# Patient Record
Sex: MALE | Race: WHITE | Employment: FULL TIME | URBAN - METROPOLITAN AREA
[De-identification: names, ages, dates, MRNs, and addresses within clinical notes are randomized per-mention and may not be internally consistent; named-entity substitution may affect disease eponyms.]

---

## 2018-07-20 ENCOUNTER — APPOINTMENT (OUTPATIENT)
Dept: GENERAL RADIOLOGY | Facility: CLINIC | Age: 23
End: 2018-07-20
Attending: EMERGENCY MEDICINE
Payer: COMMERCIAL

## 2018-07-20 ENCOUNTER — HOSPITAL ENCOUNTER (INPATIENT)
Facility: CLINIC | Age: 23
LOS: 2 days | Discharge: HOME | End: 2018-07-22
Attending: EMERGENCY MEDICINE | Admitting: INTERNAL MEDICINE
Payer: COMMERCIAL

## 2018-07-20 DIAGNOSIS — E10.10 DIABETIC KETOACIDOSIS WITHOUT COMA ASSOCIATED WITH TYPE 1 DIABETES MELLITUS (H): ICD-10-CM

## 2018-07-20 LAB
ALBUMIN SERPL-MCNC: 3.4 G/DL (ref 3.4–5)
ALBUMIN UR-MCNC: 30 MG/DL
ALP SERPL-CCNC: 151 U/L (ref 40–150)
ALT SERPL W P-5'-P-CCNC: 59 U/L (ref 0–70)
ANION GAP SERPL CALCULATED.3IONS-SCNC: 12 MMOL/L (ref 3–14)
ANION GAP SERPL CALCULATED.3IONS-SCNC: 15 MMOL/L (ref 3–14)
ANION GAP SERPL CALCULATED.3IONS-SCNC: 15 MMOL/L (ref 3–14)
ANION GAP SERPL CALCULATED.3IONS-SCNC: 17 MMOL/L (ref 3–14)
ANION GAP SERPL CALCULATED.3IONS-SCNC: 19 MMOL/L (ref 3–14)
ANION GAP SERPL CALCULATED.3IONS-SCNC: 19 MMOL/L (ref 3–14)
ANION GAP SERPL CALCULATED.3IONS-SCNC: 24 MMOL/L (ref 3–14)
ANION GAP SERPL CALCULATED.3IONS-SCNC: 29 MMOL/L (ref 3–14)
APPEARANCE UR: CLEAR
AST SERPL W P-5'-P-CCNC: 33 U/L (ref 0–45)
BACTERIA #/AREA URNS HPF: ABNORMAL /HPF
BASE DEFICIT BLDV-SCNC: 23.7 MMOL/L
BASE DEFICIT BLDV-SCNC: 26 MMOL/L
BASE DEFICIT BLDV-SCNC: 26.3 MMOL/L
BASOPHILS # BLD AUTO: 0.2 10E9/L (ref 0–0.2)
BASOPHILS NFR BLD AUTO: 1.1 %
BILIRUB DIRECT SERPL-MCNC: <0.1 MG/DL (ref 0–0.2)
BILIRUB SERPL-MCNC: 0.4 MG/DL (ref 0.2–1.3)
BILIRUB UR QL STRIP: NEGATIVE
BUN SERPL-MCNC: 11 MG/DL (ref 7–30)
BUN SERPL-MCNC: 12 MG/DL (ref 7–30)
BUN SERPL-MCNC: 15 MG/DL (ref 7–30)
BUN SERPL-MCNC: 18 MG/DL (ref 7–30)
BUN SERPL-MCNC: 21 MG/DL (ref 7–30)
BUN SERPL-MCNC: 7 MG/DL (ref 7–30)
BUN SERPL-MCNC: 8 MG/DL (ref 7–30)
BUN SERPL-MCNC: 9 MG/DL (ref 7–30)
CALCIUM SERPL-MCNC: 6.9 MG/DL (ref 8.5–10.1)
CALCIUM SERPL-MCNC: 6.9 MG/DL (ref 8.5–10.1)
CALCIUM SERPL-MCNC: 7.1 MG/DL (ref 8.5–10.1)
CALCIUM SERPL-MCNC: 7.1 MG/DL (ref 8.5–10.1)
CALCIUM SERPL-MCNC: 7.2 MG/DL (ref 8.5–10.1)
CALCIUM SERPL-MCNC: 7.3 MG/DL (ref 8.5–10.1)
CALCIUM SERPL-MCNC: 7.5 MG/DL (ref 8.5–10.1)
CALCIUM SERPL-MCNC: 9.1 MG/DL (ref 8.5–10.1)
CHLORIDE SERPL-SCNC: 107 MMOL/L (ref 94–109)
CHLORIDE SERPL-SCNC: 109 MMOL/L (ref 94–109)
CHLORIDE SERPL-SCNC: 109 MMOL/L (ref 94–109)
CHLORIDE SERPL-SCNC: 110 MMOL/L (ref 94–109)
CHLORIDE SERPL-SCNC: 112 MMOL/L (ref 94–109)
CHLORIDE SERPL-SCNC: 95 MMOL/L (ref 94–109)
CO2 SERPL-SCNC: 11 MMOL/L (ref 20–32)
CO2 SERPL-SCNC: 12 MMOL/L (ref 20–32)
CO2 SERPL-SCNC: 15 MMOL/L (ref 20–32)
CO2 SERPL-SCNC: 15 MMOL/L (ref 20–32)
CO2 SERPL-SCNC: 5 MMOL/L (ref 20–32)
CO2 SERPL-SCNC: 6 MMOL/L (ref 20–32)
CO2 SERPL-SCNC: 8 MMOL/L (ref 20–32)
CO2 SERPL-SCNC: 9 MMOL/L (ref 20–32)
COLOR UR AUTO: ABNORMAL
CREAT SERPL-MCNC: 0.62 MG/DL (ref 0.66–1.25)
CREAT SERPL-MCNC: 0.63 MG/DL (ref 0.66–1.25)
CREAT SERPL-MCNC: 0.67 MG/DL (ref 0.66–1.25)
CREAT SERPL-MCNC: 0.69 MG/DL (ref 0.66–1.25)
CREAT SERPL-MCNC: 0.7 MG/DL (ref 0.66–1.25)
CREAT SERPL-MCNC: 0.86 MG/DL (ref 0.66–1.25)
CREAT SERPL-MCNC: 1.06 MG/DL (ref 0.66–1.25)
CREAT SERPL-MCNC: 1.16 MG/DL (ref 0.66–1.25)
DIFFERENTIAL METHOD BLD: ABNORMAL
EOSINOPHIL # BLD AUTO: 0 10E9/L (ref 0–0.7)
EOSINOPHIL NFR BLD AUTO: 0.1 %
ERYTHROCYTE [DISTWIDTH] IN BLOOD BY AUTOMATED COUNT: 12.1 % (ref 10–15)
GFR SERPL CREATININE-BSD FRML MDRD: 77 ML/MIN/1.7M2
GFR SERPL CREATININE-BSD FRML MDRD: 85 ML/MIN/1.7M2
GFR SERPL CREATININE-BSD FRML MDRD: >90 ML/MIN/1.7M2
GLUCOSE BLDC GLUCOMTR-MCNC: 109 MG/DL (ref 70–99)
GLUCOSE BLDC GLUCOMTR-MCNC: 111 MG/DL (ref 70–99)
GLUCOSE BLDC GLUCOMTR-MCNC: 115 MG/DL (ref 70–99)
GLUCOSE BLDC GLUCOMTR-MCNC: 118 MG/DL (ref 70–99)
GLUCOSE BLDC GLUCOMTR-MCNC: 129 MG/DL (ref 70–99)
GLUCOSE BLDC GLUCOMTR-MCNC: 140 MG/DL (ref 70–99)
GLUCOSE BLDC GLUCOMTR-MCNC: 143 MG/DL (ref 70–99)
GLUCOSE BLDC GLUCOMTR-MCNC: 144 MG/DL (ref 70–99)
GLUCOSE BLDC GLUCOMTR-MCNC: 145 MG/DL (ref 70–99)
GLUCOSE BLDC GLUCOMTR-MCNC: 191 MG/DL (ref 70–99)
GLUCOSE BLDC GLUCOMTR-MCNC: 193 MG/DL (ref 70–99)
GLUCOSE BLDC GLUCOMTR-MCNC: 197 MG/DL (ref 70–99)
GLUCOSE BLDC GLUCOMTR-MCNC: 197 MG/DL (ref 70–99)
GLUCOSE BLDC GLUCOMTR-MCNC: 199 MG/DL (ref 70–99)
GLUCOSE BLDC GLUCOMTR-MCNC: 204 MG/DL (ref 70–99)
GLUCOSE BLDC GLUCOMTR-MCNC: 204 MG/DL (ref 70–99)
GLUCOSE BLDC GLUCOMTR-MCNC: 211 MG/DL (ref 70–99)
GLUCOSE BLDC GLUCOMTR-MCNC: 227 MG/DL (ref 70–99)
GLUCOSE BLDC GLUCOMTR-MCNC: 361 MG/DL (ref 70–99)
GLUCOSE BLDC GLUCOMTR-MCNC: 541 MG/DL (ref 70–99)
GLUCOSE BLDC GLUCOMTR-MCNC: >600 MG/DL (ref 70–99)
GLUCOSE SERPL-MCNC: 143 MG/DL (ref 70–99)
GLUCOSE SERPL-MCNC: 153 MG/DL (ref 70–99)
GLUCOSE SERPL-MCNC: 157 MG/DL (ref 70–99)
GLUCOSE SERPL-MCNC: 170 MG/DL (ref 70–99)
GLUCOSE SERPL-MCNC: 222 MG/DL (ref 70–99)
GLUCOSE SERPL-MCNC: 241 MG/DL (ref 70–99)
GLUCOSE SERPL-MCNC: 313 MG/DL (ref 70–99)
GLUCOSE SERPL-MCNC: 627 MG/DL (ref 70–99)
GLUCOSE UR STRIP-MCNC: >499 MG/DL
HBA1C MFR BLD: 12.5 % (ref 0–5.6)
HCO3 BLDV-SCNC: 4 MMOL/L (ref 21–28)
HCO3 BLDV-SCNC: 5 MMOL/L (ref 21–28)
HCO3 BLDV-SCNC: 5 MMOL/L (ref 21–28)
HCT VFR BLD AUTO: 50.2 % (ref 40–53)
HGB BLD-MCNC: 16.5 G/DL (ref 13.3–17.7)
HGB UR QL STRIP: ABNORMAL
IMM GRANULOCYTES # BLD: 0.1 10E9/L (ref 0–0.4)
IMM GRANULOCYTES NFR BLD: 0.5 %
KETONES BLD-SCNC: 3.3 MMOL/L (ref 0–0.6)
KETONES BLD-SCNC: 4.6 MMOL/L (ref 0–0.6)
KETONES BLD-SCNC: 5.2 MMOL/L (ref 0–0.6)
KETONES BLD-SCNC: 5.3 MMOL/L (ref 0–0.6)
KETONES BLD-SCNC: 5.4 MMOL/L (ref 0–0.6)
KETONES BLD-SCNC: 5.9 MMOL/L (ref 0–0.6)
KETONES UR STRIP-MCNC: 80 MG/DL
LACTATE BLD-SCNC: 2.3 MMOL/L (ref 0.7–2)
LEUKOCYTE ESTERASE UR QL STRIP: NEGATIVE
LYMPHOCYTES # BLD AUTO: 1.6 10E9/L (ref 0.8–5.3)
LYMPHOCYTES NFR BLD AUTO: 10.5 %
MCH RBC QN AUTO: 30.6 PG (ref 26.5–33)
MCHC RBC AUTO-ENTMCNC: 32.9 G/DL (ref 31.5–36.5)
MCV RBC AUTO: 93 FL (ref 78–100)
MONOCYTES # BLD AUTO: 1.1 10E9/L (ref 0–1.3)
MONOCYTES NFR BLD AUTO: 7.4 %
MRSA DNA SPEC QL NAA+PROBE: NEGATIVE
MUCOUS THREADS #/AREA URNS LPF: PRESENT /LPF
NEUTROPHILS # BLD AUTO: 12 10E9/L (ref 1.6–8.3)
NEUTROPHILS NFR BLD AUTO: 80.4 %
NITRATE UR QL: NEGATIVE
NRBC # BLD AUTO: 0 10*3/UL
NRBC BLD AUTO-RTO: 0 /100
O2/TOTAL GAS SETTING VFR VENT: ABNORMAL %
OXYHGB MFR BLDV: 69 %
OXYHGB MFR BLDV: 79 %
OXYHGB MFR BLDV: 81 %
PCO2 BLDV: 18 MM HG (ref 40–50)
PCO2 BLDV: 18 MM HG (ref 40–50)
PCO2 BLDV: 23 MM HG (ref 40–50)
PH BLDV: 6.95 PH (ref 7.32–7.43)
PH BLDV: 6.96 PH (ref 7.32–7.43)
PH BLDV: 7.05 PH (ref 7.32–7.43)
PH UR STRIP: 5 PH (ref 5–7)
PLATELET # BLD AUTO: 372 10E9/L (ref 150–450)
PO2 BLDV: 47 MM HG (ref 25–47)
PO2 BLDV: 47 MM HG (ref 25–47)
PO2 BLDV: 50 MM HG (ref 25–47)
POTASSIUM SERPL-SCNC: 3.3 MMOL/L (ref 3.4–5.3)
POTASSIUM SERPL-SCNC: 3.7 MMOL/L (ref 3.4–5.3)
POTASSIUM SERPL-SCNC: 3.8 MMOL/L (ref 3.4–5.3)
POTASSIUM SERPL-SCNC: 3.8 MMOL/L (ref 3.4–5.3)
POTASSIUM SERPL-SCNC: 4 MMOL/L (ref 3.4–5.3)
POTASSIUM SERPL-SCNC: 4.1 MMOL/L (ref 3.4–5.3)
POTASSIUM SERPL-SCNC: 4.2 MMOL/L (ref 3.4–5.3)
POTASSIUM SERPL-SCNC: 4.9 MMOL/L (ref 3.4–5.3)
PROT SERPL-MCNC: 6.8 G/DL (ref 6.8–8.8)
RBC # BLD AUTO: 5.4 10E12/L (ref 4.4–5.9)
RBC #/AREA URNS AUTO: 0 /HPF (ref 0–2)
SODIUM SERPL-SCNC: 130 MMOL/L (ref 133–144)
SODIUM SERPL-SCNC: 136 MMOL/L (ref 133–144)
SODIUM SERPL-SCNC: 137 MMOL/L (ref 133–144)
SODIUM SERPL-SCNC: 139 MMOL/L (ref 133–144)
SODIUM SERPL-SCNC: 140 MMOL/L (ref 133–144)
SOURCE: ABNORMAL
SP GR UR STRIP: 1.02 (ref 1–1.03)
SPECIMEN SOURCE: NORMAL
UROBILINOGEN UR STRIP-MCNC: 0 MG/DL (ref 0–2)
WBC # BLD AUTO: 15 10E9/L (ref 4–11)
WBC #/AREA URNS AUTO: <1 /HPF (ref 0–5)

## 2018-07-20 PROCEDURE — 83605 ASSAY OF LACTIC ACID: CPT | Performed by: EMERGENCY MEDICINE

## 2018-07-20 PROCEDURE — 25000131 ZZH RX MED GY IP 250 OP 636 PS 637: Performed by: EMERGENCY MEDICINE

## 2018-07-20 PROCEDURE — 82805 BLOOD GASES W/O2 SATURATION: CPT | Performed by: EMERGENCY MEDICINE

## 2018-07-20 PROCEDURE — 25000131 ZZH RX MED GY IP 250 OP 636 PS 637: Performed by: INTERNAL MEDICINE

## 2018-07-20 PROCEDURE — 87040 BLOOD CULTURE FOR BACTERIA: CPT | Performed by: INTERNAL MEDICINE

## 2018-07-20 PROCEDURE — 71046 X-RAY EXAM CHEST 2 VIEWS: CPT

## 2018-07-20 PROCEDURE — 27210995 ZZH RX 272: Performed by: INTERNAL MEDICINE

## 2018-07-20 PROCEDURE — 25000125 ZZHC RX 250: Performed by: INTERNAL MEDICINE

## 2018-07-20 PROCEDURE — 00000146 ZZHCL STATISTIC GLUCOSE BY METER IP

## 2018-07-20 PROCEDURE — 99285 EMERGENCY DEPT VISIT HI MDM: CPT | Mod: 25

## 2018-07-20 PROCEDURE — 81001 URINALYSIS AUTO W/SCOPE: CPT | Performed by: EMERGENCY MEDICINE

## 2018-07-20 PROCEDURE — 25000128 H RX IP 250 OP 636: Performed by: INTERNAL MEDICINE

## 2018-07-20 PROCEDURE — 83036 HEMOGLOBIN GLYCOSYLATED A1C: CPT | Performed by: EMERGENCY MEDICINE

## 2018-07-20 PROCEDURE — 80048 BASIC METABOLIC PNL TOTAL CA: CPT | Performed by: EMERGENCY MEDICINE

## 2018-07-20 PROCEDURE — 36415 COLL VENOUS BLD VENIPUNCTURE: CPT | Performed by: INTERNAL MEDICINE

## 2018-07-20 PROCEDURE — 96365 THER/PROPH/DIAG IV INF INIT: CPT

## 2018-07-20 PROCEDURE — 82010 KETONE BODYS QUAN: CPT | Performed by: INTERNAL MEDICINE

## 2018-07-20 PROCEDURE — 20000003 ZZH R&B ICU

## 2018-07-20 PROCEDURE — 25000132 ZZH RX MED GY IP 250 OP 250 PS 637: Performed by: INTERNAL MEDICINE

## 2018-07-20 PROCEDURE — 25800025 ZZH RX 258: Performed by: INTERNAL MEDICINE

## 2018-07-20 PROCEDURE — 96366 THER/PROPH/DIAG IV INF ADDON: CPT

## 2018-07-20 PROCEDURE — 80048 BASIC METABOLIC PNL TOTAL CA: CPT | Performed by: INTERNAL MEDICINE

## 2018-07-20 PROCEDURE — 99291 CRITICAL CARE FIRST HOUR: CPT | Performed by: INTERNAL MEDICINE

## 2018-07-20 PROCEDURE — 85025 COMPLETE CBC W/AUTO DIFF WBC: CPT | Performed by: EMERGENCY MEDICINE

## 2018-07-20 PROCEDURE — 25000128 H RX IP 250 OP 636: Performed by: EMERGENCY MEDICINE

## 2018-07-20 PROCEDURE — 82805 BLOOD GASES W/O2 SATURATION: CPT | Performed by: INTERNAL MEDICINE

## 2018-07-20 PROCEDURE — 80076 HEPATIC FUNCTION PANEL: CPT | Performed by: EMERGENCY MEDICINE

## 2018-07-20 PROCEDURE — 87641 MR-STAPH DNA AMP PROBE: CPT | Performed by: INTERNAL MEDICINE

## 2018-07-20 PROCEDURE — 82010 KETONE BODYS QUAN: CPT | Performed by: EMERGENCY MEDICINE

## 2018-07-20 PROCEDURE — 87640 STAPH A DNA AMP PROBE: CPT | Performed by: INTERNAL MEDICINE

## 2018-07-20 RX ORDER — DEXTROSE MONOHYDRATE 25 G/50ML
25-50 INJECTION, SOLUTION INTRAVENOUS
Status: DISCONTINUED | OUTPATIENT
Start: 2018-07-20 | End: 2018-07-22 | Stop reason: HOSPADM

## 2018-07-20 RX ORDER — ONDANSETRON 2 MG/ML
4 INJECTION INTRAMUSCULAR; INTRAVENOUS EVERY 6 HOURS PRN
Status: DISCONTINUED | OUTPATIENT
Start: 2018-07-20 | End: 2018-07-22 | Stop reason: HOSPADM

## 2018-07-20 RX ORDER — POTASSIUM CHLORIDE 1.5 G/1.58G
20-40 POWDER, FOR SOLUTION ORAL
Status: DISCONTINUED | OUTPATIENT
Start: 2018-07-20 | End: 2018-07-22 | Stop reason: HOSPADM

## 2018-07-20 RX ORDER — POTASSIUM CHLORIDE 1500 MG/1
20-40 TABLET, EXTENDED RELEASE ORAL
Status: DISCONTINUED | OUTPATIENT
Start: 2018-07-20 | End: 2018-07-22 | Stop reason: HOSPADM

## 2018-07-20 RX ORDER — POTASSIUM CHLORIDE 7.45 MG/ML
10 INJECTION INTRAVENOUS
Status: DISCONTINUED | OUTPATIENT
Start: 2018-07-20 | End: 2018-07-22 | Stop reason: HOSPADM

## 2018-07-20 RX ORDER — POTASSIUM CHLORIDE 29.8 MG/ML
20 INJECTION INTRAVENOUS
Status: DISCONTINUED | OUTPATIENT
Start: 2018-07-20 | End: 2018-07-22 | Stop reason: HOSPADM

## 2018-07-20 RX ORDER — SODIUM CHLORIDE AND POTASSIUM CHLORIDE 150; 450 MG/100ML; MG/100ML
INJECTION, SOLUTION INTRAVENOUS CONTINUOUS
Status: DISCONTINUED | OUTPATIENT
Start: 2018-07-20 | End: 2018-07-21

## 2018-07-20 RX ORDER — BISMUTH SUBSALICYLATE 262MG/15ML
SUSPENSION, ORAL (FINAL DOSE FORM) ORAL
COMMUNITY
End: 2018-07-20

## 2018-07-20 RX ORDER — NALOXONE HYDROCHLORIDE 0.4 MG/ML
.1-.4 INJECTION, SOLUTION INTRAMUSCULAR; INTRAVENOUS; SUBCUTANEOUS
Status: DISCONTINUED | OUTPATIENT
Start: 2018-07-20 | End: 2018-07-22 | Stop reason: HOSPADM

## 2018-07-20 RX ORDER — SODIUM CHLORIDE 450 MG/100ML
1000 INJECTION, SOLUTION INTRAVENOUS CONTINUOUS
Status: DISCONTINUED | OUTPATIENT
Start: 2018-07-20 | End: 2018-07-20

## 2018-07-20 RX ORDER — POTASSIUM CL/LIDO/0.9 % NACL 10MEQ/0.1L
10 INTRAVENOUS SOLUTION, PIGGYBACK (ML) INTRAVENOUS
Status: DISCONTINUED | OUTPATIENT
Start: 2018-07-20 | End: 2018-07-22 | Stop reason: HOSPADM

## 2018-07-20 RX ORDER — ENALAPRIL MALEATE 10 MG/1
10 TABLET ORAL DAILY
Status: ON HOLD | COMMUNITY
End: 2020-02-13

## 2018-07-20 RX ORDER — NICOTINE POLACRILEX 4 MG
15-30 LOZENGE BUCCAL
Status: DISCONTINUED | OUTPATIENT
Start: 2018-07-20 | End: 2018-07-22 | Stop reason: HOSPADM

## 2018-07-20 RX ORDER — POTASSIUM CHLORIDE 7.45 MG/ML
10 INJECTION INTRAVENOUS
Status: CANCELLED | OUTPATIENT
Start: 2018-07-20

## 2018-07-20 RX ORDER — DEXTROSE MONOHYDRATE, SODIUM CHLORIDE, AND POTASSIUM CHLORIDE 50; 1.49; 4.5 G/1000ML; G/1000ML; G/1000ML
INJECTION, SOLUTION INTRAVENOUS CONTINUOUS
Status: DISCONTINUED | OUTPATIENT
Start: 2018-07-20 | End: 2018-07-21

## 2018-07-20 RX ORDER — IBUPROFEN 200 MG
200 TABLET ORAL EVERY 6 HOURS PRN
COMMUNITY

## 2018-07-20 RX ORDER — ALBUTEROL SULFATE 90 UG/1
2 AEROSOL, METERED RESPIRATORY (INHALATION) EVERY 6 HOURS
COMMUNITY
End: 2019-04-19

## 2018-07-20 RX ORDER — ONDANSETRON 2 MG/ML
4 INJECTION INTRAMUSCULAR; INTRAVENOUS EVERY 30 MIN PRN
Status: DISCONTINUED | OUTPATIENT
Start: 2018-07-20 | End: 2018-07-20

## 2018-07-20 RX ADMIN — SODIUM CHLORIDE 1000 ML: 9 INJECTION, SOLUTION INTRAVENOUS at 04:30

## 2018-07-20 RX ADMIN — POTASSIUM CHLORIDE 20 MEQ: 1.5 POWDER, FOR SOLUTION ORAL at 23:14

## 2018-07-20 RX ADMIN — ONDANSETRON 4 MG: 2 INJECTION INTRAMUSCULAR; INTRAVENOUS at 14:36

## 2018-07-20 RX ADMIN — SODIUM CHLORIDE 1000 ML: 9 INJECTION, SOLUTION INTRAVENOUS at 06:15

## 2018-07-20 RX ADMIN — FAMOTIDINE 20 MG: 10 INJECTION, SOLUTION INTRAVENOUS at 20:35

## 2018-07-20 RX ADMIN — POTASSIUM CHLORIDE 40 MEQ: 1.5 POWDER, FOR SOLUTION ORAL at 21:18

## 2018-07-20 RX ADMIN — SODIUM CHLORIDE 1000 ML: 9 INJECTION, SOLUTION INTRAVENOUS at 02:27

## 2018-07-20 RX ADMIN — POTASSIUM CHLORIDE, DEXTROSE MONOHYDRATE AND SODIUM CHLORIDE 125 ML/HR: 150; 5; 450 INJECTION, SOLUTION INTRAVENOUS at 15:12

## 2018-07-20 RX ADMIN — SODIUM CHLORIDE 5 UNITS/HR: 9 INJECTION, SOLUTION INTRAVENOUS at 06:10

## 2018-07-20 RX ADMIN — SODIUM CHLORIDE 1000 ML: 9 INJECTION, SOLUTION INTRAVENOUS at 03:42

## 2018-07-20 RX ADMIN — POTASSIUM CHLORIDE: 2 INJECTION, SOLUTION, CONCENTRATE INTRAVENOUS at 08:04

## 2018-07-20 RX ADMIN — SODIUM CHLORIDE 4 UNITS/HR: 9 INJECTION, SOLUTION INTRAVENOUS at 19:36

## 2018-07-20 RX ADMIN — POTASSIUM CHLORIDE, DEXTROSE MONOHYDRATE AND SODIUM CHLORIDE: 150; 5; 450 INJECTION, SOLUTION INTRAVENOUS at 07:23

## 2018-07-20 RX ADMIN — ONDANSETRON 4 MG: 2 INJECTION INTRAMUSCULAR; INTRAVENOUS at 09:57

## 2018-07-20 RX ADMIN — FAMOTIDINE 20 MG: 10 INJECTION, SOLUTION INTRAVENOUS at 10:01

## 2018-07-20 RX ADMIN — SODIUM CHLORIDE 5.5 UNITS/HR: 9 INJECTION, SOLUTION INTRAVENOUS at 02:51

## 2018-07-20 RX ADMIN — POTASSIUM CHLORIDE, DEXTROSE MONOHYDRATE AND SODIUM CHLORIDE: 150; 5; 450 INJECTION, SOLUTION INTRAVENOUS at 23:14

## 2018-07-20 ASSESSMENT — ENCOUNTER SYMPTOMS
FEVER: 0
VOMITING: 1
FREQUENCY: 1
COUGH: 0
ABDOMINAL PAIN: 0

## 2018-07-20 ASSESSMENT — VISUAL ACUITY: OU: BLURRED VISION;DOUBLE VISION/DIPLOPIA

## 2018-07-20 ASSESSMENT — ACTIVITIES OF DAILY LIVING (ADL)
ADLS_ACUITY_SCORE: 9

## 2018-07-20 ASSESSMENT — PAIN DESCRIPTION - DESCRIPTORS: DESCRIPTORS: HEADACHE

## 2018-07-20 NOTE — IP AVS SNAPSHOT
MRN:[id number]                      After Visit Summary   7/20/2018    Bandar Cadena    MRN: [id number]           Thank you!     Thank you for choosing [facility] for your care. Our goal is always to provide you with excellent care. Hearing back from our patients is one way we can continue to improve our services. Please take a few minutes to complete the written survey that you may receive in the mail after you visit. If you would like to speak to someone directly about your visit please contact Patient Relations at [phone]. Thank you!          Patient Information     Date Of Birth          1995        Designated Caregiver       Most Recent Value    Caregiver    Will someone help with your care after discharge? yes    Name of designated caregiver Kim Cadena    Phone number of caregiver [phone]    Caregiver address [address] [address], [address] [address]      About your hospital stay     You were admitted on:  July 20, 2018 You last received care in the:  [facility]ensive [facility] Unit    You were discharged on:  July 22, 2018        Reason for your hospital stay       Admitted for uncontrolled DM with DKA                  Who to Call     For medical emergencies, please call 911.  For non-urgent questions about your medical care, please call your primary care provider or clinic, [phone]          Attending Provider     Provider Specialty    Chidi Stoddard MD Emergency Medicine    Doron Trinidad MD Internal Medicine    Ilan Rodríguez MD Internal Medicine       Primary Care Provider Office Phone # Fax #    Bertrand Asher [phone]      After Care Instructions     Activity       Your activity upon discharge: activity as tolerated            Diet       Follow this diet upon discharge: Orders Placed This Encounter      Moderate Consistent CHO Diet                  Follow-up Appointments     Follow-up and recommended  "labs and tests        Follow up with primary care provider, SHAWN FOX, within 7 days to evaluate medication change, to evaluate treatment change and for hospital follow- up.  No follow up labs or test are needed.  Follow up with your endocrinologist                  Pending Results     Date and Time Order Name Status Description    2018 0602 Blood culture Preliminary             Statement of Approval     Ordered          18 0728  I have reviewed and agree with all the recommendations and orders detailed in this document.  EFFECTIVE NOW     Approved and electronically signed by:  Ilan Rodríguez MD             Admission Information     Date & Time Provider Department Dept. Phone    2018 Ilan Rodríguez MD [facility] Intensive Care Unit [phone]      Your Vitals Were     Blood Pressure Temperature Respirations Weight Pulse Oximetry       116/79 (BP Location: Left arm) 98.4  F (36.9  C) (Oral) 21 67.4 kg (148 lb 9.4 oz) 98%       MyChart Information     [facility] lets you send messages to your doctor, view your test results, renew your prescriptions, schedule appointments and more. To sign up, go to [email or web address] . Click on \"Log in\" on the left side of the screen, which will take you to the Welcome page. Then click on \"Sign up Now\" on the right side of the page.     You will be asked to enter the access code listed below, as well as some personal information. Please follow the directions to create your username and password.     Your access code is: [email or web address]  Expires: 10/20/2018  9:47 AM     Your access code will  in 90 days. If you need help or a new code, please call your [place] clinic or [phone].        Care EveryWhere ID     This is your Care EveryWhere ID. This could be used by other organizations to access your [place] medical records  [id number]        Equal Access to Services     ESTEPHANIA RIDDLE: momo Sahni " radhapeggy romero perez, jeff engel ah. So [facility] [phone].    ATENCIÓN: Si sarah lopez, tiene a brady disposición servicios gratuitos de asistencia lingüística. Abimael al [phone].    We comply with applicable federal civil rights laws and [place] laws. We do not discriminate on the basis of race, color, national origin, age, disability, sex, sexual orientation, or gender identity.               Review of your medicines      CONTINUE these medicines which have NOT CHANGED        Dose / Directions    albuterol 108 (90 Base) MCG/ACT Inhaler   Commonly known as:  PROAIR HFA/PROVENTIL HFA/VENTOLIN HFA        Dose:  2 puff   Inhale 2 puffs into the lungs every 6 hours   Refills:  0       enalapril 10 MG tablet   Commonly known as:  VASOTEC        Dose:  10 mg   Take 10 mg by mouth daily   Refills:  0       ibuprofen 200 MG tablet   Commonly known as:  ADVIL/MOTRIN        Dose:  200 mg   Take 200 mg by mouth every 6 hours as needed (headache)   Refills:  0       insulin glargine 100 UNIT/ML injection   Commonly known as:  LANTUS        Dose:  25 Units   Inject 25 Units Subcutaneous At Bedtime   Refills:  0       * NovoLOG FLEXPEN 100 UNIT/ML injection   Generic drug:  insulin aspart        Inject Subcutaneous 3 times daily (with meals) Correction scale: 1 unit/50mg over 150. ie 1 unit for 150-200, 2 units for 201-250 etc   Refills:  0       * NovoLOG FLEXPEN 100 UNIT/ML injection   Generic drug:  insulin aspart        Inject Subcutaneous 3 times daily (with meals) 2.5 units/15 grams carbohydrate tid with meals.   Refills:  0       * Notice:  This list has 2 medication(s) that are the same as other medications prescribed for you. Read the directions carefully, and ask your doctor or other care provider to review them with you.             Protect others around you: Learn how to safely use, store and throw away your medicines at www.disposemymeds.org.             Medication  List: This is a list of all your medications and when to take them. Check marks below indicate your daily home schedule. Keep this list as a reference.      Medications           Morning Afternoon Evening Bedtime As Needed    albuterol 108 (90 Base) MCG/ACT Inhaler   Commonly known as:  PROAIR HFA/PROVENTIL HFA/VENTOLIN HFA   Inhale 2 puffs into the lungs every 6 hours                                enalapril 10 MG tablet   Commonly known as:  VASOTEC   Take 10 mg by mouth daily                                ibuprofen 200 MG tablet   Commonly known as:  ADVIL/MOTRIN   Take 200 mg by mouth every 6 hours as needed (headache)                                insulin glargine 100 UNIT/ML injection   Commonly known as:  LANTUS   Inject 25 Units Subcutaneous At Bedtime                                * NovoLOG FLEXPEN 100 UNIT/ML injection   Inject Subcutaneous 3 times daily (with meals) Correction scale: 1 unit/50mg over 150. ie 1 unit for 150-200, 2 units for 201-250 etc   Last time this was given:  5 Units on 7/22/2018  7:19 AM   Generic drug:  insulin aspart                                * NovoLOG FLEXPEN 100 UNIT/ML injection   Inject Subcutaneous 3 times daily (with meals) 2.5 units/15 grams carbohydrate tid with meals.   Last time this was given:  5 Units on 7/22/2018  7:19 AM   Generic drug:  insulin aspart                                * Notice:  This list has 2 medication(s) that are the same as other medications prescribed for you. Read the directions carefully, and ask your doctor or other care provider to review them with you.

## 2018-07-20 NOTE — CONSULTS
Care Transition Initial Assessment - RN    Reason For Consult:  (assess for gaps in care on DKA, A1C 12.5, Type 1 )   Information gathered per AM rounds.   DATA   Active Problems:    DKA (diabetic ketoacidoses) (H)  Contact information and PCP information verified: Yes  Lives With: spouse  Insurance concerns: No Insurance issues identified  ASSESSMENT  Identified issues/concerns regarding health management: CTS following to assess for GAPS in care,in a 22 year old DM type 1, A1C 12.5.  Per rounds MD shares that pt does have an endocrinologist and is actively working to get an insulin pump at this time.   There appear to be no GAPS identified with the goal to follow up with endocrinology and continue to work towards Insulin Pump.  Pt has not had a recent admission.     Will clear consult.   No needs identified.      Perlita Dickson RN, BSN, [facility]  Care Transitions Team  [phone]

## 2018-07-20 NOTE — PLAN OF CARE
Problem: Patient Care Overview  Goal: Plan of Care/Patient Progress Review  Outcome: No Change  ICU End of Shift Summary.  For vital signs and complete assessments, please see documentation flowsheets.     Pertinent assessments: AO x 4. C/o of blurred/Diplopia x 1 lasted <5 minutes then vision returned to normal. No other neuro deficits observed see flowsheets. Insulin gtt infusing. Per wife and patient, has not been good about checking his blood glucoses at home. Patient has a temporary monitor attached to left arm from his Endocrinologist to monitor blood glucoses.   Major Shift Events:   Plan (Upcoming Events): Monitoring, needs Diabetic education.  Discharge/Transfer Needs: TBD    Bedside Shift Report Completed :   Bedside Safety Check Completed:

## 2018-07-20 NOTE — PROGRESS NOTES
Critical lab results from this a.m. CO2 8, Ketones 5.4  Results given to medical student, JULIUS Hager, no changes made to plan of care.  See flowsheet for additional assessment information.

## 2018-07-20 NOTE — PHARMACY-ADMISSION MEDICATION HISTORY
Admission medication history interview status for this patient is complete. See [facility] admission navigator for allergy information, prior to admission medications and immunization status.     Medication history interview source(s):Patient  Medication history resources (including written lists, pill bottles, clinic record): [facility] list, Care everywhere  Primary pharmacy: [facility]    Changes made to PTA medication list:  Added: enalapril, ibuprofen, doses for Insulin  Deleted: none  Changed: none    Actions taken by pharmacist (provider contacted, etc):None     Additional medication history information:None    Medication reconciliation/reorder completed by provider prior to medication history? No    Do you take OTC medications (eg tylenol, ibuprofen, fish oil, eye/ear drops, etc)? Y(Y/N)    For patients on insulin therapy: Y (Y/N)  Lantus/levemir/NPH/Mix 70/30 dose:   Y (see Med list for doses)   Sliding scale Novolog Y  If Yes, do you have a baseline novolog pre-meal dose:  2.5 units/15g carbs tid with meals  Patients eat three meals a day:   Y    How many episodes of hypoglycemia do you have per week: ?. Note A1c = 12.5.  How many missed doses do you have per week:  ~ half of prandial doses  How many times do you check your blood glucose per day: 1-3   Any Barriers to therapy - Be specific :  cost of medications, comfortable with giving injections (if applicable), comfortable and confident with current diabetes regimen:   Non-compliant,  Reason?      Prior to Admission medications    Medication Sig Last Dose Taking? Auth Provider   albuterol (PROAIR HFA/PROVENTIL HFA/VENTOLIN HFA) 108 (90 Base) MCG/ACT Inhaler Inhale 2 puffs into the lungs every 6 hours More than a month Yes Reported, Patient   enalapril (VASOTEC) 10 MG tablet Take 10 mg by mouth daily 7/19/2018 at AM Yes Unknown, Entered By History   ibuprofen (ADVIL/MOTRIN) 200 MG tablet Take 200 mg by mouth every 6 hours as needed (headache) 7/19/2018 at Unknown  time Yes Unknown, Entered By History   insulin aspart (NOVOLOG FLEXPEN) 100 UNIT/ML injection Inject Subcutaneous 3 times daily (with meals) Correction scale: 1 unit/50mg over 150. ie 1 unit for 150-200, 2 units for 201-250 etc 7/19/2018 at Unknown time Yes Unknown, Entered By History   Insulin Aspart (NOVOLOG SC) Inject 2.5 units/carb unit Subcutaneous 3 times daily (with meals)  7/19/2018 at Unknown time Yes Reported, Patient   insulin glargine (LANTUS SOLOSTAR) 100 UNIT/ML pen Inject 25 Units Subcutaneous At Bedtime 7/19/2018 at HS Yes Unknown, Entered By History

## 2018-07-20 NOTE — PROGRESS NOTES
Ketones resulted critical at 5.2, more elevated than previous check. BMP continues to be checked Q 2 hours. Med student and Dr. Rodríguez notified, no new orders obtained at this time. See flowsheet for additional assessment information.

## 2018-07-20 NOTE — IP AVS SNAPSHOT
[facility] Intensive Care Unit    [address]    [facility] [address]    Phone:  [phone]    Fax:  [phone]                                       After Visit Summary   7/20/2018    Bandar Cadena    MRN: [id number]           After Visit Summary Signature Page     I have received my discharge instructions, and my questions have been answered. I have discussed any challenges I see with this plan with the nurse or doctor.    ..........................................................................................................................................  Patient/Patient Representative Signature      ..........................................................................................................................................  Patient Representative Print Name and Relationship to Patient    ..................................................               ................................................  Date                                            Time    ..........................................................................................................................................  Reviewed by Signature/Title    ...................................................              ..............................................  Date                                                            Time

## 2018-07-20 NOTE — ED TRIAGE NOTES
Patient alert and oriented times 3 .  Abc intact trouble with heartburn recently today last insulin at 1430. Took his blood sugar at 0145 and 465.

## 2018-07-20 NOTE — ED PROVIDER NOTES
"  History     Chief Complaint:  Hyperglycemia    HPI   Bandar Cadena is a 22 year old male with a history of type 1 diabetes and DKA who presents to the Emergency Department for evaluation of hyperglycemia. The patient reports four days of progressively worsening shortness of breath, difficulty sleeping, frequent urination, abdominal pain, chest discomfort and GERD, \"feeling as if he is going into DKA again\". He did have one episode of vomiting at midnight. Here in the ED the patient states his blood sugars have been ranging from the high 300's to mid 400's over the past few days. He last took his blood sugar at 0145 this morning, which read 465. He denies any fevers or cough.    Allergies:  No known drug allergies    Medications:    Albuterol inhaler   Novalog  Lantus    Past Medical History:    Type 1 diabetes mellitus  Asthma    Past Surgical History:    The patient does not have any pertinent past surgical history.    Family History:    No past pertinent family history.    Social History:  Smoking Status: never smoker  Alcohol Use: no     Review of Systems   Constitutional: Negative for fever.   Respiratory: Negative for cough.    Gastrointestinal: Positive for vomiting. Negative for abdominal pain.   Genitourinary: Positive for frequency.   All other systems reviewed and are negative.    Physical Exam   First Vitals:  Patient Vitals for the past 24 hrs:   BP Temp Temp src Heart Rate Resp SpO2 Weight   07/20/18 0415 - - - 117 (!) 40 (!) 89 % -   07/20/18 0315 125/68 - - 110 21 100 % -   07/20/18 0300 123/71 - - 109 19 99 % -   07/20/18 0230 - - - 105 20 96 % -   07/20/18 0215 126/80 - - - - 98 % -   07/20/18 0158 (!) 134/93 97.6  F (36.4  C) Oral 116 - 95 % 66.8 kg (147 lb 4.3 oz)       Physical Exam  Nursing note and vitals reviewed.  Constitutional: Cooperative.   HENT:   Mouth/Throat: Mucous membranes are very dry  Cardiovascular: Tachycardic, regular rhythm and normal heart sounds.  No " murmur.  Pulmonary/Chest: Kussmaul respiration pattern. No wheezes. No rales.   Abdominal: Soft. Normal appearance and bowel sounds are normal. No distension. There is no tenderness. There is no rigidity and no guarding.   Musculoskeletal: Normal range of motion.   Neurological: Alert. Oriented x4. Gait and strength normal.   Skin: Skin is warm and dry. No rash noted.   Psychiatric: Normal mood and affect.     Emergency Department Course     Imaging:  Radiographic findings were communicated with the patient who voiced understanding of the findings.    Chest XR:  No infiltrates or other acute findings. Heart size is within normal limits. As per radiology.     Laboratory:  CBC: WBC: 15.0(H), HGB: 16.5, PLT: 372  BMP: Glucose 627(HH), (L), carbon dioxide 6 (LL), anion gap 29(H), o/w WNL (Creat 1.16)    Lactic acid: 2.3(H)    Blood gas venous and oxyhgb: ph 6.95(LL), CO2 23(L), bicarbonate 5(LL).    Ketone Beta-Hydroxybutyrate Quantitative: 5.3(HH)    Hemoglobin A1c: 12.5(H)    0233 Glucose by meter: >600(HH)  0404 Glucose by meter: 541(HH)    UA with Microscopic: Glc >499 , urineketon 80, small urine blood, protein albumin 30, few bacteria, mucous present.    Interventions:  0227 NS 1 L IV  0251 Insulin drip initiated per  DKA protocol  0342 NS 1 L IV    Emergency Department Course:  Nursing notes and vitals reviewed. I performed an exam of the patient as documented above.     Blood drawn. This was sent to the lab for further testing, results above.    The patient provided a urine sample here in the emergency department. This was sent for laboratory testing, findings above.    0326 I reassessed the patient.     0330 I spoke with Dr. Washington regarding this patient. Requested chest xray.    The patient was sent for a chest x-ray while here in the emergency department, findings above.    Findings and plan explained to the Patient who consents to admission. Discussed the patient with Dr. Washington, who will admit the  patient to an ICU bed for further monitoring, evaluation, and treatment.    Impression & Plan      Medical Decision Making:  Bandar Cadena is a 22 year old male with a history of type 1 diabetes who presents with concern that he is in DKA. He has had increasing shortness of breath, abdominal pain, and elevated sugars. He also has increased thirst and urination. Work up is consistent with DKA with markedly elevated glucose, ketones and a venous pH of less than 7. Cause of his DKA is likely in discretion and non compliance of his insulin regimen. I see no evidence of an infectious process. His exam is otherwise unremarkable. Potassium reassuring.  He will be admitted to the ICU on our insulin protocol.     Critical Care time:  was 30 minutes for this patient excluding procedures.    Diagnosis:    ICD-10-CM   1. Diabetic ketoacidosis without coma associated with type 1 diabetes mellitus (H) E10.10       Disposition:  Admit to ICU      IBernie, latrice serving as a scribe on 7/20/2018 at 2:07 AM to personally document services performed by Chidi Stoddard MD based on my observations and the provider's statements to me.     Bernie Cancino  7/20/2018   [facility] EMERGENCY DEPARTMENT       Chidi Stoddard MD  07/20/18 0433

## 2018-07-20 NOTE — PLAN OF CARE
Problem: Patient Care Overview  Goal: Plan of Care/Patient Progress Review  Outcome: Improving  ICU End of Shift Summary.  For vital signs and complete assessments, please see documentation flowsheets.     Pertinent assessments: A/O X 4 up with SBA to BR. Lungs clear, ST. BM yesterday. N/V. Voiding. Not tolerating PO. MIVF infusing. PIV X 2  Major Shift Events: Insulin drip, labs Q 4 hours, slow to correct. Ketones still up. Potassium replacement ordered per protocol. Zofran given for nausea. Slight epigastric acid reflux, pepcid given. Painfree now.  Plan (Upcoming Events): DKA management and blood sugars. Correct labs.  Discharge/Transfer Needs: home when able. Needs compliance with DM. Looking into insulin pump with endocrinologist.    Bedside Shift Report Completed   Bedside Safety Check Completed

## 2018-07-20 NOTE — PROGRESS NOTES
Admitted earlier today.  Seen and examined.  H&P reviewed.  Mr. Cadena is feeling fine and the rest of his hemodynamics are stable.  Serum glucose levels has been improving.  Remain on insulin drip protocol.  This is his second DKA with last event back in November 2017.  He stated that he is not very compliant with his insulin regimen for his long-standing insulin requiring diabetes mellitus.  No current complaints of chest pain, shortness of breath, remained afebrile and no signs and symptoms of an infectious process.  Likely is DKA secondary to his noncompliance with dietary modifications, lifestyle changes and his insulin medications.  Extensive discussion provided with Mr. Cadena and his wife in attendance at bedside regarding importance of tight control of diabetes and strokes strict compliance with medications.  He has his own endocrinologist that he sees regularly and earlier had some conversation regarding initiation of insulin pump.  Will defer this with his outpatient follow-up with them.  Continue inpatient care today.  On clear liquids.  He is agreeable for continuation of hospitalization.    Anne

## 2018-07-20 NOTE — H&P
Admitted:     07/20/2018      CHIEF COMPLAINT:  High blood glucose, abdominal pain, polyuria.      HISTORY OF PRESENT ILLNESS:  Bandar Cadena is a 22-year-old gentleman with diabetes mellitus type 1 diagnosed 13 years ago, history of DKA, who was relatively at his baseline state of health until 5 days ago when he started to have abdominal discomfort, thirsty, increased urination which is progressively getting worse and the patient was concerned and he checked his glucose and it was very high and came to the emergency room.  The patient takes Lantus 25 units at bedtime and takes glucose per carb count. He is noncompliant with his glucose check. Per his wife, he does not check it regularly.  The patient denied any fever or chills, no cough or shortness of breath, no sweating, no urinary urgency, but has frequency and polyuria.  In the emergency room, he was evaluated, discussed with Dr. Stoddard.  His blood work showed sodium of 130 and glucose on presentation was over 600 and his bicarbonate was 6.  His ketone level was also elevated at 5.3 and he is being admitted to the Intensive Care Unit with  DKA..      PAST MEDICAL HISTORY:   1.  Diabetes mellitus type 1.   2.  History of DKA. He was admitted at [facility] a few times in the past.   3.  Childhood asthma.      PAST SURGICAL HISTORY:  None.      FAMILY HISTORY:  No significant family medical condition known to the patient.      SOCIAL HISTORY:  He does not smoke, does not drink alcohol.  He is in the emergency room with his wife.  He does not use illicit drugs.      REVIEW OF SYSTEMS:  Ten points reviewed, all are negative except those mentioned in history of present illness.  The patient reported that he had 1 episode of vomiting.      HOME MEDICATIONS:  Needs to be reviewed by pharmacy, but includes:   Prior to Admission Medications   Prescriptions Last Dose Informant Patient Reported? Taking?   Insulin Aspart (NOVOLOG SC)   Yes Yes   Insulin Glargine (LANTUS SC)    Yes Yes   albuterol (PROAIR HFA/PROVENTIL HFA/VENTOLIN HFA) 108 (90 Base) MCG/ACT Inhaler   Yes Yes   Sig: Inhale 2 puffs into the lungs every 6 hours      Facility-Administered Medications: None       ALLERGIES:  NO KNOWN DRUG ALLERGIES.      PHYSICAL EXAMINATION:   GENERAL:  The patient is awake, alert, pleasant, not in any form of distress.  Clear mentation.   VITAL SIGNS:  Blood pressure 125/68, pulse rate 110, temperature 97.6, oxygen saturation 100% on room air.   HEENT:  Pink, nonicteric.  Extraocular muscle movement intact.  Dry oral mucosa and tongue.   NECK:  Supple, no JVD, no thyromegaly.   CHEST:  Good air entry bilaterally.  No wheezing, crackles or rales.   CARDIOVASCULAR:  S1, S2 were heard, no gallop or murmur.   ABDOMEN:  Soft, nontender, nondistended, positive bowel sounds, no organomegaly.   EXTREMITIES:  No edema, cyanosis or clubbing.  Poor skin turgor.   SKIN:  No skin rash.   PSYCHIATRIC:  Normal mood and affect, keeps eye contact, pleasant, cooperative, responds to question appropriately.      DIAGNOSTIC TESTS OF INTEREST:  Sodium 130, potassium 4.9, bicarbonate 6, BUN 21, creatinine 1.1, anion gap 29.  Hemoglobin A1c 12.5, ketones 5.3.  Lactic acid 2.3, glucose 541.  WBC 15, hemoglobin 16, platelets 375.  Urinalysis negative.    Chest x-ray done showed no infiltrates.      ASSESSMENT:  Bandar Cadena is a 22-year-old gentleman with history of type 1 diabetes mellitus and  diabetic ketoacidosis, who presents today with a complaint of polydypsia, polyuria, generalized weakness, episode of vomiting, and abdominal pain, found to be in diabetic ketoacidosis and being admitted to the Intensive Care Unit.        EMERGENCY ROOM COURSE:  In the emergency room, the patient already got 2 liters of IV fluid and started on DKA protocol.      IMPRESSION:   1.  Diabetic ketoacidosis.   2.  Diabetes mellitus type 1.   3.  Lactic acidemia.      PLAN:  The patient is being admitted to the Intensive Care Unit  on DKA protocol.  His mental status is completely normal.  He is in severe DKA.  His venous pH showed 6.95, pCO2 is 23, pO2 is 47, bicarbonate is 5.  We will repeat VBG and if it is less than 7, will give patient bicarbonate.  I expect his numbers to improve just with IV hydration.  He got 2 liters of IV fluid in the emergency room and requested a third liter.  In the meantime, the patient will have additional liter of normal saline, and will repeat BMP and ABG/VBG and further decision will be made after that.  At this point, the patient is hemodynamically stable.  He is slightly tachycardic, which I expect to improve with hydration.  I discussed with the patient and his wife at length the need to control his glucose.  His hemoglobin A1c is 12.5, which shows his glucose has not been controlled, at least in the last 3 months, and the  importance of controlling his glucose to decrease risk associated with uncontrolled diabetes.      Total time spent coordinating his care, critical time over 60 minutes.  I called and made aware tele ICU.         DONNA RAMIREZ MD             D: 2018   T: 2018   MT: DAILY      Name:     MANJU DURBIN   MRN:      [id number]        Account:      [id number]   :      1995        Admitted:     2018                   Document: [id number]

## 2018-07-21 LAB
ANION GAP SERPL CALCULATED.3IONS-SCNC: 11 MMOL/L (ref 3–14)
ANION GAP SERPL CALCULATED.3IONS-SCNC: 9 MMOL/L (ref 3–14)
BUN SERPL-MCNC: 5 MG/DL (ref 7–30)
BUN SERPL-MCNC: 7 MG/DL (ref 7–30)
CALCIUM SERPL-MCNC: 7.1 MG/DL (ref 8.5–10.1)
CALCIUM SERPL-MCNC: 7.2 MG/DL (ref 8.5–10.1)
CHLORIDE SERPL-SCNC: 112 MMOL/L (ref 94–109)
CHLORIDE SERPL-SCNC: 112 MMOL/L (ref 94–109)
CO2 SERPL-SCNC: 14 MMOL/L (ref 20–32)
CO2 SERPL-SCNC: 18 MMOL/L (ref 20–32)
CREAT SERPL-MCNC: 0.65 MG/DL (ref 0.66–1.25)
CREAT SERPL-MCNC: 0.72 MG/DL (ref 0.66–1.25)
ERYTHROCYTE [DISTWIDTH] IN BLOOD BY AUTOMATED COUNT: 12.6 % (ref 10–15)
GFR SERPL CREATININE-BSD FRML MDRD: >90 ML/MIN/1.7M2
GFR SERPL CREATININE-BSD FRML MDRD: >90 ML/MIN/1.7M2
GLUCOSE BLDC GLUCOMTR-MCNC: 108 MG/DL (ref 70–99)
GLUCOSE BLDC GLUCOMTR-MCNC: 109 MG/DL (ref 70–99)
GLUCOSE BLDC GLUCOMTR-MCNC: 125 MG/DL (ref 70–99)
GLUCOSE BLDC GLUCOMTR-MCNC: 129 MG/DL (ref 70–99)
GLUCOSE BLDC GLUCOMTR-MCNC: 133 MG/DL (ref 70–99)
GLUCOSE BLDC GLUCOMTR-MCNC: 138 MG/DL (ref 70–99)
GLUCOSE BLDC GLUCOMTR-MCNC: 142 MG/DL (ref 70–99)
GLUCOSE BLDC GLUCOMTR-MCNC: 144 MG/DL (ref 70–99)
GLUCOSE BLDC GLUCOMTR-MCNC: 158 MG/DL (ref 70–99)
GLUCOSE BLDC GLUCOMTR-MCNC: 163 MG/DL (ref 70–99)
GLUCOSE BLDC GLUCOMTR-MCNC: 169 MG/DL (ref 70–99)
GLUCOSE BLDC GLUCOMTR-MCNC: 171 MG/DL (ref 70–99)
GLUCOSE BLDC GLUCOMTR-MCNC: 172 MG/DL (ref 70–99)
GLUCOSE BLDC GLUCOMTR-MCNC: 210 MG/DL (ref 70–99)
GLUCOSE BLDC GLUCOMTR-MCNC: 221 MG/DL (ref 70–99)
GLUCOSE SERPL-MCNC: 151 MG/DL (ref 70–99)
GLUCOSE SERPL-MCNC: 153 MG/DL (ref 70–99)
HCT VFR BLD AUTO: 38.7 % (ref 40–53)
HGB BLD-MCNC: 13.4 G/DL (ref 13.3–17.7)
KETONES BLD-SCNC: 1 MMOL/L (ref 0–0.6)
KETONES BLD-SCNC: 1.7 MMOL/L (ref 0–0.6)
KETONES BLD-SCNC: 2.9 MMOL/L (ref 0–0.6)
MCH RBC QN AUTO: 30.3 PG (ref 26.5–33)
MCHC RBC AUTO-ENTMCNC: 34.6 G/DL (ref 31.5–36.5)
MCV RBC AUTO: 88 FL (ref 78–100)
PHOSPHATE SERPL-MCNC: 1 MG/DL (ref 2.5–4.5)
PHOSPHATE SERPL-MCNC: 1.3 MG/DL (ref 2.5–4.5)
PLATELET # BLD AUTO: 203 10E9/L (ref 150–450)
POTASSIUM SERPL-SCNC: 3.6 MMOL/L (ref 3.4–5.3)
POTASSIUM SERPL-SCNC: 4.2 MMOL/L (ref 3.4–5.3)
RBC # BLD AUTO: 4.42 10E12/L (ref 4.4–5.9)
SODIUM SERPL-SCNC: 137 MMOL/L (ref 133–144)
SODIUM SERPL-SCNC: 139 MMOL/L (ref 133–144)
WBC # BLD AUTO: 7.2 10E9/L (ref 4–11)

## 2018-07-21 PROCEDURE — 25800025 ZZH RX 258: Performed by: INTERNAL MEDICINE

## 2018-07-21 PROCEDURE — 25000125 ZZHC RX 250: Performed by: INTERNAL MEDICINE

## 2018-07-21 PROCEDURE — 25000128 H RX IP 250 OP 636: Performed by: INTERNAL MEDICINE

## 2018-07-21 PROCEDURE — 82010 KETONE BODYS QUAN: CPT | Performed by: INTERNAL MEDICINE

## 2018-07-21 PROCEDURE — 25000131 ZZH RX MED GY IP 250 OP 636 PS 637: Performed by: INTERNAL MEDICINE

## 2018-07-21 PROCEDURE — 85027 COMPLETE CBC AUTOMATED: CPT | Performed by: INTERNAL MEDICINE

## 2018-07-21 PROCEDURE — 99207 ZZC CDG-MDM COMPONENT: MEETS LOW - DOWN CODED: CPT | Performed by: INTERNAL MEDICINE

## 2018-07-21 PROCEDURE — 36415 COLL VENOUS BLD VENIPUNCTURE: CPT | Performed by: INTERNAL MEDICINE

## 2018-07-21 PROCEDURE — 00000146 ZZHCL STATISTIC GLUCOSE BY METER IP

## 2018-07-21 PROCEDURE — 84100 ASSAY OF PHOSPHORUS: CPT | Performed by: INTERNAL MEDICINE

## 2018-07-21 PROCEDURE — 99231 SBSQ HOSP IP/OBS SF/LOW 25: CPT | Performed by: INTERNAL MEDICINE

## 2018-07-21 PROCEDURE — 20000003 ZZH R&B ICU

## 2018-07-21 PROCEDURE — 80048 BASIC METABOLIC PNL TOTAL CA: CPT | Performed by: INTERNAL MEDICINE

## 2018-07-21 RX ORDER — DEXTROSE MONOHYDRATE 25 G/50ML
25-50 INJECTION, SOLUTION INTRAVENOUS
Status: DISCONTINUED | OUTPATIENT
Start: 2018-07-21 | End: 2018-07-22 | Stop reason: HOSPADM

## 2018-07-21 RX ORDER — NICOTINE POLACRILEX 4 MG
15-30 LOZENGE BUCCAL
Status: DISCONTINUED | OUTPATIENT
Start: 2018-07-21 | End: 2018-07-22 | Stop reason: HOSPADM

## 2018-07-21 RX ADMIN — POTASSIUM CHLORIDE, DEXTROSE MONOHYDRATE AND SODIUM CHLORIDE: 150; 5; 450 INJECTION, SOLUTION INTRAVENOUS at 06:28

## 2018-07-21 RX ADMIN — SODIUM CHLORIDE: 9 INJECTION, SOLUTION INTRAVENOUS at 10:18

## 2018-07-21 RX ADMIN — FAMOTIDINE 20 MG: 10 INJECTION, SOLUTION INTRAVENOUS at 08:31

## 2018-07-21 RX ADMIN — POTASSIUM PHOSPHATE, MONOBASIC AND POTASSIUM PHOSPHATE, DIBASIC 25 MMOL: 224; 236 INJECTION, SOLUTION, CONCENTRATE INTRAVENOUS at 10:18

## 2018-07-21 RX ADMIN — FAMOTIDINE 20 MG: 10 INJECTION, SOLUTION INTRAVENOUS at 19:28

## 2018-07-21 RX ADMIN — INSULIN GLARGINE 20 UNITS: 100 INJECTION, SOLUTION SUBCUTANEOUS at 12:49

## 2018-07-21 ASSESSMENT — ACTIVITIES OF DAILY LIVING (ADL)
ADLS_ACUITY_SCORE: 9

## 2018-07-21 NOTE — PROVIDER NOTIFICATION
Tele hub notified of critical ketone result of 3.3 down from previous result of 5.2. BMP results in process.     0054- tele hub notified of most recent ketone critical result: 2.9 trending down, will continue to monitor.    0611-tele hub notified of critical ketone of 1.7, no new orders received.

## 2018-07-21 NOTE — PLAN OF CARE
Problem: Patient Care Overview  Goal: Plan of Care/Patient Progress Review  ICU End of Shift Summary.  For vital signs and complete assessments, please see documentation flowsheets.     Pertinent assessments: Patient alert and oriented x 4. Pleasant throughout night. VSS. Denies pain. Ketones trending down throughout night. K 3.3 replaced x 2 with recheck 3.6. Up to void with SBA. Spouse present throughout night.   Major Shift Events: K replaced  Plan (Upcoming Events): Continue insulin gtt, monitor labs.   Discharge/Transfer Needs: Discharge home when able.    Bedside Shift Report Completed : yes  Bedside Safety Check Completed: yes

## 2018-07-21 NOTE — PROGRESS NOTES
[facility]  Hospitalist Progress Note  Ilan Rodríguez MD, MD 07/21/2018  (Text Page)  Reason for Stay (Diagnosis): DKA         Assessment and Plan:      Summary of Stay: Bandar Cadena is a 22 year old male with known history of insulin requiring diabetes mellitus admitted on 7/20/2018 with nausea, vomiting found with DKA.    Problem List:   1. Diabetic ketoacidosis  2. Severe uncontrolled insulin requiring diabetes mellitus  3. Noncompliance with medications, dietary restrictions and lifestyle modifications    Continue inpatient care.  He is anion gap acidosis has been continuously improving.  He is no longer having nausea or vomiting.  He is tolerating clear liquids.  Advance diet as tolerated.  Transition him to subcutaneous basal and prandial insulin and stop his IV insulin 2 hours after administration of long-acting insulin.  Start him with 20 units of long-acting basal as he usually takes 25 units at bedtime since he is still on full liquids but can easily transition and back doing home regimen once he is demonstrating tolerance for regular diabetic diet.  Initiation of his prandial insulin short acting coverage with 2.5 units per 15 g of carbohydrates each meals.  High intensity insulin sliding scale also ordered.  I had another extensive discussion with her patient and with his wife in attendance at bedside regarding the importance of compliance and getting his diabetes under control.    DVT Prophylaxis: Pneumatic Compression Devices and Ambulate every shift  Code Status: Full Code  Discharge Dispo: Home  Estimated Disch Date / # of Days until Disch: 12-24 hours        Interval History (Subjective):      Continuing CARE today.  Seen and examined.  Chart reviewed.  Case discussed with nursing service.  Bandar is feeling better with no further recurrence of any nausea or vomiting.  He is demonstrating tolerance with oral diet are not that clear liquids.  He denies any abdominal pain, no  diarrhea, no mental status changes.  Hemodynamics are stable.  Remain afebrile.                 Physical Exam:      Last Vital Signs:  /66 (BP Location: Left arm)  Temp 97.3  F (36.3  C) (Oral)  Resp 10  Wt 67.4 kg (148 lb 9.4 oz)  SpO2 98%    I/O last 3 completed shifts:  In: 2630.06 [P.O.:480; I.V.:2150.06]  Out: 1000 [Urine:1000]  Wt Readings from Last 1 Encounters:   07/20/18 67.4 kg (148 lb 9.4 oz)     Vitals:    07/20/18 0158 07/20/18 0600   Weight: 66.8 kg (147 lb 4.3 oz) 67.4 kg (148 lb 9.4 oz)       Constitutional: Awake, alert, cooperative, no apparent distress   Respiratory: Clear to auscultation bilaterally, no crackles or wheezing   Cardiovascular: Regular rate and rhythm, normal S1 and S2, and no murmur noted   Abdomen: Normal bowel sounds, soft, non-distended, non-tender   Skin: No rashes, no cyanosis, dry to touch   Neuro: Alert and oriented x3, no weakness, spontaneous and coherent speech   Extremities: No edema, normal range of motion   Other(s): Euthymic mood, not agitated       All other systems: Negative          Medications:      All current medications were reviewed with changes reflected in problem list.         Data:      All new lab and imaging data was reviewed.   Labs:    Recent Labs  Lab 07/20/18  0627   CULT No growth after 23 hours       Recent Labs  Lab 07/21/18  0410 07/21/18  0012 07/20/18 2008    137 140   POTASSIUM 3.6 4.2 3.3*   CHLORIDE 112* 112* 110*   CO2 18* 14* 15*   ANIONGAP 9 11 15*   * 153* 143*   BUN 5* 7 7   CR 0.65* 0.72 0.63*   GFRESTIMATED >90 >90 >90   GFRESTBLACK >90 >90 >90   SPEEDY 7.2* 7.1* 7.3*       Recent Labs  Lab 07/21/18  0559 07/20/18  0232   WBC 7.2 15.0*   HGB 13.4 16.5   HCT 38.7* 50.2   MCV 88 93    372     No results for input(s): SED, CRP in the last 168 hours.    Recent Labs  Lab 07/21/18  1101 07/21/18  1001 07/21/18  0859 07/21/18  0754 07/21/18  0655  07/21/18  0410  07/21/18  0012  07/20/18  2008  07/20/18  1804   07/20/18  1439   GLC  --   --   --   --   --   --  151*  --  153*  --  143*  --  170*  --  241*   * 221* 144* 108* 133*  < >  --   < >  --   < > 140*  < >  --   < >  --    < > = values in this interval not displayed.    Recent Labs  Lab 07/20/18  0232   COLOR Straw   APPEARANCE Clear   URINEGLC >499*   URINEBILI Negative   URINEKETONE 80*   SG 1.021   UBLD Small*   URINEPH 5.0   PROTEIN 30*   NITRITE Negative   LEUKEST Negative   RBCU 0   WBCU <1      Imaging:   Results for orders placed or performed during the hospital encounter of 07/20/18   Chest XR,  PA & LAT    Narrative    XR CHEST 2 VW   7/20/2018 4:07 AM     INDICATION: DKA.    COMPARISON: None.      Impression    IMPRESSION: No infiltrates or other acute findings. Heart size is  within normal limits.    CODI COLEY MD

## 2018-07-21 NOTE — PROGRESS NOTES
"NUTRITION ASSESSMENT & EDUCATION NOTE    REASON FOR ASSESSMENT:  Positive nutrition risk screen: uncontrolled/new diabetes    NUTRITION HISTORY:  Information obtained from patient  Home diet - regular  Meals TID. Consistent intake, aims for 5-6 CHO units per meal. Drinks diet soda or water. Packs lunch from home. Eats out on occasion but visits similar places and aware of CHO content of foods. Has resources available to him and previous diet education - able to verbalize label reading, portion sizes, 5-6 CHO choices per meal. Denied education needs and stated this is a compliance issue.    CURRENT DIET AND NOURISHMENT ORDER:  Diet: Full liquids  Current Intake/Tolerance: Ongoing N/V while in DKA    ANTHROPOMETRICS  Height: 6'1\"  Weight: 68.4 kg  Body mass index 19.88 kg/m^2  Weight Status: Normal (BMI 18.5-24.9)  Ideal body weight: 83.6 kg +/- 10%, 82% of IBW   Weight History:  Patient denied any recent changes to weight, concerns with current weight  Wt Readings from Last 10 Encounters:   07/20/18 67.4 kg (148 lb 9.4 oz)       ASSESSED NUTRIENT NEEDS (PER APPROVED PRACTICE GUIDELINES, Dosing weight: 68.4 kg)  Estimated Energy Needs: 7367-5650 kcals (30-35 Kcal/Kg)  Justification: borderline underweight with poorly controlled DM  Estimated Protein Needs: 68-82 grams protein (1-1.2 g pro/Kg)  Justification: maintenance  Estimated Fluid Needs: >1 mL/Kcal  Justification: maintenance    LABS/MEDICATIONS/PHYSICAL FINDINGS:  Meds reviewed  Overall thin but well nourished  Labs reviewed:  Creatinine (mg/dL)   Date Value   07/21/2018 0.65 (L)   07/21/2018 0.72   07/20/2018 0.63 (L)   07/20/2018 0.69   07/20/2018 0.67   07/20/2018 0.62 (L)     Glucose (mg/dL)   Date Value   07/21/2018 151 (H)   07/21/2018 153 (H)   07/20/2018 143 (H)   07/20/2018 170 (H)   07/20/2018 241 (H)   07/20/2018 157 (H)     Sodium (mmol/L)   Date Value   07/21/2018 139   07/21/2018 137   07/20/2018 140   07/20/2018 137   07/20/2018 137   07/20/2018 " 137     Potassium (mmol/L)   Date Value   07/21/2018 3.6   07/21/2018 4.2   07/20/2018 3.3 (L)   07/20/2018 3.8   07/20/2018 3.7   07/20/2018 3.8       Lab Results   Component Value Date    A1C 12.5 07/20/2018       Recent Labs  Lab 07/21/18  0410 07/21/18  0012 07/20/18  2008 07/20/18  1804 07/20/18  1439 07/20/18  1202 07/20/18  1019 07/20/18  0758 07/20/18  0509 07/20/18  0232   * 153* 143* 170* 241* 157* 222* 153* 313* 627*       No results found for: CHOL, CHOLHDLRATIO, HDL, LDL    MALNUTRITION:  Patient does not meet two of the following criteria necessary for diagnosing malnutrition: significant weight loss, reduced intake, subcutaneous fat loss, muscle loss or fluid retention    NUTRITION DIAGNOSIS:  Altered lab value (Hgb A1c) related to poor compliance for DM control as evidenced by Hgb A1c of 12.5, patient report of adequate knowledge regarding CHO counting    INTERVENTIONS:  Nutrition Prescription:  Recommended diet advancement per MD discretion to regular, ensure insulin coverage reflects normal/home eating patterns.    Implementation:    Assessed learning needs, learning preferences and willingness to learn     Nutrition Education (Content and application):  a) Reviewed 15 grams CHO per 1 CHO choice; patient able to verbalize recommendation of  5-6 CHO choices per meal.   b) Emphasis on fruits and vegetables at each meal, continued consumption of sugar free beverages  c) Encouraged label reading and assessing restaurant menus for better choices  d) Patient denied need for handouts, review of CHO counting or suggestions for apps (wife confirmed at bedside)      Anticipate fair compliance - compliance issues is not related to lack of knowledge    Diet Education - refer to Education Flowsheet    Goals:    Patient verbalizes understanding of diet     All of the above goals met during education session    Follow Up/Monitoring:    Provided RD contact information for future questions    Recommend  Out-Patient Nutrition Referral if further diet instructions are needed    Progress towards goals will be monitored and evaluated per protocol and Practice Guidelines      Wendy Simental RDN, LD, CNSC  Pager - 3rd floor/ICU: [phone]  Pager - All other floors: [phone]  Pager - Weekend/holiday: [phone]  Office: [phone]

## 2018-07-21 NOTE — PLAN OF CARE
Problem: Patient Care Overview  Goal: Plan of Care/Patient Progress Review  Outcome: Improving  ICU End of Shift Summary.  For vital signs and complete assessments, please see documentation flowsheets.     Pertinent assessments: AOX4, LS clear on RA, GI/ no n/v/d voids adequately, up with SBA, calls appropriately.  Major Shift Events: Insulin gtt d/c'd, sliding scale insulin, diet advanced   Plan (Upcoming Events): follow labs, blood sugars  Discharge/Transfer Needs: transfer to floor vs. home    Bedside Shift Report Completed : yes  Bedside Safety Check Completed: yes

## 2018-07-22 VITALS
TEMPERATURE: 98.4 F | SYSTOLIC BLOOD PRESSURE: 116 MMHG | RESPIRATION RATE: 21 BRPM | OXYGEN SATURATION: 98 % | DIASTOLIC BLOOD PRESSURE: 79 MMHG | WEIGHT: 148.59 LBS

## 2018-07-22 LAB
ANION GAP SERPL CALCULATED.3IONS-SCNC: 10 MMOL/L (ref 3–14)
BUN SERPL-MCNC: 3 MG/DL (ref 7–30)
CALCIUM SERPL-MCNC: 7.7 MG/DL (ref 8.5–10.1)
CHLORIDE SERPL-SCNC: 108 MMOL/L (ref 94–109)
CO2 SERPL-SCNC: 23 MMOL/L (ref 20–32)
CREAT SERPL-MCNC: 0.56 MG/DL (ref 0.66–1.25)
GFR SERPL CREATININE-BSD FRML MDRD: >90 ML/MIN/1.7M2
GLUCOSE BLDC GLUCOMTR-MCNC: 217 MG/DL (ref 70–99)
GLUCOSE BLDC GLUCOMTR-MCNC: 246 MG/DL (ref 70–99)
GLUCOSE SERPL-MCNC: 212 MG/DL (ref 70–99)
PHOSPHATE SERPL-MCNC: 2.4 MG/DL (ref 2.5–4.5)
POTASSIUM SERPL-SCNC: 3.4 MMOL/L (ref 3.4–5.3)
SODIUM SERPL-SCNC: 141 MMOL/L (ref 133–144)

## 2018-07-22 PROCEDURE — 84100 ASSAY OF PHOSPHORUS: CPT | Performed by: INTERNAL MEDICINE

## 2018-07-22 PROCEDURE — 00000146 ZZHCL STATISTIC GLUCOSE BY METER IP

## 2018-07-22 PROCEDURE — 36415 COLL VENOUS BLD VENIPUNCTURE: CPT | Performed by: INTERNAL MEDICINE

## 2018-07-22 PROCEDURE — 25000125 ZZHC RX 250: Performed by: INTERNAL MEDICINE

## 2018-07-22 PROCEDURE — 80048 BASIC METABOLIC PNL TOTAL CA: CPT | Performed by: INTERNAL MEDICINE

## 2018-07-22 PROCEDURE — 25000128 H RX IP 250 OP 636: Performed by: INTERNAL MEDICINE

## 2018-07-22 PROCEDURE — 99239 HOSP IP/OBS DSCHRG MGMT >30: CPT | Performed by: INTERNAL MEDICINE

## 2018-07-22 PROCEDURE — 25000131 ZZH RX MED GY IP 250 OP 636 PS 637: Performed by: INTERNAL MEDICINE

## 2018-07-22 RX ADMIN — INSULIN GLARGINE 25 UNITS: 100 INJECTION, SOLUTION SUBCUTANEOUS at 08:15

## 2018-07-22 RX ADMIN — POTASSIUM PHOSPHATE, MONOBASIC AND POTASSIUM PHOSPHATE, DIBASIC 20 MMOL: 224; 236 INJECTION, SOLUTION, CONCENTRATE INTRAVENOUS at 00:02

## 2018-07-22 ASSESSMENT — ACTIVITIES OF DAILY LIVING (ADL)
ADLS_ACUITY_SCORE: 9

## 2018-07-22 NOTE — PLAN OF CARE
Problem: Patient Care Overview  Goal: Plan of Care/Patient Progress Review  Outcome: Adequate for Discharge Date Met: 07/22/18  Discharge info reviews with pt and wife, all pt belongings with pt, PIV site CDI, pt ready to go home.

## 2018-07-22 NOTE — PLAN OF CARE
Problem: Patient Care Overview  Goal: Plan of Care/Patient Progress Review  ICU End of Shift Summary.  For vital signs and complete assessments, please see documentation flowsheets.     Pertinent assessments: Afebrile. Denies pain. Blood glucoses 129 and 217 this shift. Slept most of night.   Major Shift Events: Phos replaced with recheck scheduled for 0800 this am.   Plan (Upcoming Events): Continue sliding scale insulin and carb counting.   Discharge/Transfer Needs: Transfer to floor vs discharge home today?    Bedside Shift Report Completed : yes  Bedside Safety Check Completed: yes

## 2018-07-22 NOTE — DISCHARGE SUMMARY
[facility]  Discharge Summary  Name: Bandar Cadena    MRN: [id number]  YOB: 1995    Age: 22 year old  Date of Discharge:  7/22/2018  Date of Admission: 7/20/2018  Primary Care Provider: Bertrand Asher  Discharge Physician:  Ilan Rodríguez MD  Discharging Service:  Hospitalist      Discharge Diagnosis:  Diabetic ketoacidosis  Insulin requiring diabetes mellitus uncontrolled  Noncompliance     Other Diagnosis:  Past Medical History:   Diagnosis Date     Asthma      Diabetes mellitus type 1 (H)           Discharge Disposition:  Discharged to home     Allergies:  No Known Allergies     Discharge Medications:   Current Discharge Medication List      CONTINUE these medications which have NOT CHANGED    Details   albuterol (PROAIR HFA/PROVENTIL HFA/VENTOLIN HFA) 108 (90 Base) MCG/ACT Inhaler Inhale 2 puffs into the lungs every 6 hours      enalapril (VASOTEC) 10 MG tablet Take 10 mg by mouth daily      ibuprofen (ADVIL/MOTRIN) 200 MG tablet Take 200 mg by mouth every 6 hours as needed (headache)      !! insulin aspart (NOVOLOG FLEXPEN) 100 UNIT/ML injection Inject Subcutaneous 3 times daily (with meals) Correction scale: 1 unit/50mg over 150. ie 1 unit for 150-200, 2 units for 201-250 etc      !! insulin aspart (NOVOLOG FLEXPEN) 100 UNIT/ML injection Inject Subcutaneous 3 times daily (with meals) 2.5 units/15 grams carbohydrate tid with meals.      insulin glargine (LANTUS SOLOSTAR) 100 UNIT/ML pen Inject 25 Units Subcutaneous At Bedtime       !! - Potential duplicate medications found. Please discuss with provider.           Condition on Discharge:  Discharge condition: Stable   Discharge vitals: Blood pressure 116/79, temperature 98.4  F (36.9  C), temperature source Oral, resp. rate 21, weight 67.4 kg (148 lb 9.4 oz), SpO2 98 %.   Code status on discharge: Full Code     History of Present Illness:  See detailed admission note for full details.        Significant Physical Exam Findings Day  of Discharge:  HEENT; Atraumatic, normocephalic, pinkish conjuctiva, pupils bilateral reactive   Skin: warm and moist, no rashes  Lymphatics: no cervical or axillary lymphandenopathy  Lungs: equal chest expansion, clear to auscultation, no wheezes, no stridor, no crackles,   Heart: normal rate, normal rhythm, no rubs or gallops.   Abdomen: normal bowel sounds, no tenderness, no peritoneal signs, no guarding  Extremities: no deformities, no edema   Neuro; follow commands, alert and oriented x3, spontaneous speech, coherent, moves all extremities spontaneously  Psych; no hallucination, euthymic mood, not agitated        Procedures other than Imaging:  None     Imaging:  Results for orders placed or performed during the hospital encounter of 07/20/18   Chest XR,  PA & LAT    Narrative    XR CHEST 2 VW   7/20/2018 4:07 AM     INDICATION: DKA.    COMPARISON: None.      Impression    IMPRESSION: No infiltrates or other acute findings. Heart size is  within normal limits.    CODI COLEY MD        Consultations:  No consultations were requested during this admission.     Recent Lab Results:    Recent Labs  Lab 07/21/18  0559 07/20/18  0232   WBC 7.2 15.0*   HGB 13.4 16.5   HCT 38.7* 50.2   MCV 88 93    372       Recent Labs  Lab 07/20/18  0627   CULT No growth after 2 days       Recent Labs  Lab 07/22/18  0744 07/22/18  0531 07/21/18  1929 07/21/18  0559 07/21/18  0410 07/21/18  0012  07/20/18  0509   NA  --  141  --   --  139 137  < > 136   POTASSIUM  --  3.4  --   --  3.6 4.2  < > 4.0   CHLORIDE  --  108  --   --  112* 112*  < > 107   CO2  --  23  --   --  18* 14*  < > 5*   ANIONGAP  --  10  --   --  9 11  < > 24*   GLC  --  212*  --   --  151* 153*  < > 313*   BUN  --  3*  --   --  5* 7  < > 18   CR  --  0.56*  --   --  0.65* 0.72  < > 1.06   GFRESTIMATED  --  >90  --   --  >90 >90  < > 85   GFRESTBLACK  --  >90  --   --  >90 >90  < > >90   SPEEDY  --  7.7*  --   --  7.2* 7.1*  < > 7.5*   PHOS 2.4*  --  1.3*  1.0*  --   --   --   --    PROTTOTAL  --   --   --   --   --   --   --  6.8   ALBUMIN  --   --   --   --   --   --   --  3.4   BILITOTAL  --   --   --   --   --   --   --  0.4   ALKPHOS  --   --   --   --   --   --   --  151*   AST  --   --   --   --   --   --   --  33   ALT  --   --   --   --   --   --   --  59   < > = values in this interval not displayed.    Recent Labs  Lab 07/22/18  0714 07/22/18  0531 07/22/18  0156 07/21/18  2151 07/21/18  1258 07/21/18  1152  07/21/18  0410  07/21/18  0012  07/20/18 2008 07/20/18  1804   GLC  --  212*  --   --   --   --   --  151*  --  153*  --  143*  --  170*   *  --  217* 129* 109* 142*  < >  --   < >  --   < > 140*  < >  --    < > = values in this interval not displayed.    Recent Labs  Lab 07/20/18  0232   LACT 2.3*     No results for input(s): TROPONIN, TROPI, TROPR in the last 168 hours.    Invalid input(s): TROP, TROPONINIES    Recent Labs  Lab 07/20/18  0232   COLOR Straw   APPEARANCE Clear   URINEGLC >499*   URINEBILI Negative   URINEKETONE 80*   SG 1.021   UBLD Small*   URINEPH 5.0   PROTEIN 30*   NITRITE Negative   LEUKEST Negative   RBCU 0   WBCU <1          Pending Results:    Unresulted Labs Ordered in the Past 30 Days of this Admission     Date and Time Order Name Status Description    7/20/2018 0602 Blood culture Preliminary            Discharge Instructions and Follow-Up:   Discharge diet:   Active Diet Order      Moderate Consistent CHO Diet      Diet   Discharge activity: Activity as tolerated   Discharge follow-up: 1-2 weeks with PCP   Outpatient therapy: None    Other instructions: None      Hospital Course:  Mr. Bucio is currently doing well as he is able to tolerate oral diet with no further recurrence of abdominal pain, nausea, vomiting.  His serum glucose has been continuously improving, he is severe acidosis with significant anion gap has closed and resolved.  No obvious source of infectious process that resulted to this diabetic  ketoacidosis.  Unfortunately the most obvious etiology here is patient's noncompliance with his insulin medications, dietary restrictions and lifestyle modifications.  He is aware of this and reassured me that he will do is very best to be as compliant as he can be regarding this diabetes control.  Of note this is the patient's second DKA in the past year or so.  He sees an endocrinologist that has a follow-up care for with him in the next 2 weeks.  This was also discussed with his wife at bedside earlier.  No new prescriptions written upon discharge as he only needs to take since basal, prandial, insulin sliding scale as prescribed.     Total time spent in face to face contact with the patient and coordinating discharge was:  > 30 Minutes.

## 2018-07-26 LAB
BACTERIA SPEC CULT: NO GROWTH
Lab: NORMAL
SPECIMEN SOURCE: NORMAL

## 2019-02-07 ENCOUNTER — OFFICE VISIT (OUTPATIENT)
Dept: INTERNAL MEDICINE | Facility: CLINIC | Age: 24
End: 2019-02-07
Payer: COMMERCIAL

## 2019-02-07 VITALS
TEMPERATURE: 98 F | WEIGHT: 153.7 LBS | HEIGHT: 74 IN | OXYGEN SATURATION: 99 % | RESPIRATION RATE: 20 BRPM | SYSTOLIC BLOOD PRESSURE: 110 MMHG | BODY MASS INDEX: 19.73 KG/M2 | HEART RATE: 113 BPM | DIASTOLIC BLOOD PRESSURE: 60 MMHG

## 2019-02-07 DIAGNOSIS — E10.65 TYPE 1 DIABETES MELLITUS WITH HYPERGLYCEMIA (H): Primary | ICD-10-CM

## 2019-02-07 LAB — HBA1C MFR BLD: 12.7 % (ref 0–5.6)

## 2019-02-07 PROCEDURE — 36415 COLL VENOUS BLD VENIPUNCTURE: CPT | Performed by: INTERNAL MEDICINE

## 2019-02-07 PROCEDURE — 83036 HEMOGLOBIN GLYCOSYLATED A1C: CPT | Performed by: INTERNAL MEDICINE

## 2019-02-07 PROCEDURE — 99214 OFFICE O/P EST MOD 30 MIN: CPT | Performed by: INTERNAL MEDICINE

## 2019-02-07 PROCEDURE — 82043 UR ALBUMIN QUANTITATIVE: CPT | Performed by: INTERNAL MEDICINE

## 2019-02-07 PROCEDURE — 80048 BASIC METABOLIC PNL TOTAL CA: CPT | Performed by: INTERNAL MEDICINE

## 2019-02-07 ASSESSMENT — MIFFLIN-ST. JEOR: SCORE: 1743.99

## 2019-02-07 NOTE — LETTER
February 18, 2019      Bandar Cadena  [address]   [facility] [address]        Dear ,    Multiple attempts had been made to reach you regarding your lab results. It is very important that you call the clinic to speak to me.     Resulted Orders   Hemoglobin A1c   Result Value Ref Range    Hemoglobin A1C 12.7 (H) 0 - 5.6 %      Comment:      Reviewed: OK with previous  Normal <5.7% Prediabetes 5.7-6.4%  Diabetes 6.5% or higher - adopted from ADA   consensus guidelines.     Albumin Random Urine Quantitative with Creat Ratio   Result Value Ref Range    Creatinine Urine 217 mg/dL    Albumin Urine mg/L 83 mg/L    Albumin Urine mg/g Cr 38.25 (H) 0 - 17 mg/g Cr   Basic metabolic panel   Result Value Ref Range    Sodium 139 133 - 144 mmol/L    Potassium 3.8 3.4 - 5.3 mmol/L    Chloride 105 94 - 109 mmol/L    Carbon Dioxide 28 20 - 32 mmol/L    Anion Gap 6 3 - 14 mmol/L    Glucose 171 (H) 70 - 99 mg/dL      Comment:      Non Fasting    Urea Nitrogen 20 7 - 30 mg/dL    Creatinine 0.76 0.66 - 1.25 mg/dL    GFR Estimate >90 >60 mL/min/[1.73_m2]      Comment:      Non  GFR Calc  Starting 12/18/2018, serum creatinine based estimated GFR (eGFR) will be   calculated using the Chronic Kidney Disease Epidemiology Collaboration   (CKD-EPI) equation.      GFR Estimate If Black >90 >60 mL/min/[1.73_m2]      Comment:       GFR Calc  Starting 12/18/2018, serum creatinine based estimated GFR (eGFR) will be   calculated using the Chronic Kidney Disease Epidemiology Collaboration   (CKD-EPI) equation.      Calcium 8.9 8.5 - 10.1 mg/dL       If you have any questions or concerns, please call the clinic at the number listed above.       Sincerely,        Marko Brink MD

## 2019-02-07 NOTE — NURSING NOTE
"Vital signs:  Temp: 98  F (36.7  C) Temp src: Oral BP: 110/60 Pulse: 113   Resp: 20 SpO2: 99 %     Height: 186.7 cm (6' 1.5\") Weight: 69.7 kg (153 lb 11.2 oz)  Estimated body mass index is 20 kg/m  as calculated from the following:    Height as of this encounter: 1.867 m (6' 1.5\").    Weight as of this encounter: 69.7 kg (153 lb 11.2 oz).        "

## 2019-02-07 NOTE — PROGRESS NOTES
ASSESSMENT/PLAN:         1. Type 1 diabetes mellitus with hyperglycemia (H)    A1c markedly elevated to 12.7%    His TDD would be around 50-60 units (using formula 0.5-1 units/kg and his weight). Would like 1/2 to be in basal insulin glargine.     Will increase his basal insulin to 35 qpm given several high morning sugars.  He will use roughly 20 units in rapid acting insulin throughout the day to make up the total.  Patient is not answering phone calls after several attempts, a letter has been mailed to patient to discuss this change.       Novolog pen refilled with instructions as below      Foot exam done today  Patient will schedule eye exam  No indication for asa/statin  On ACEI, does have evidence of albuminuria.         - Hemoglobin A1c  - Albumin Random Urine Quantitative with Creat Ratio  - Basic metabolic panel  - insulin aspart (NOVOLOG FLEXPEN) 100 UNIT/ML pen; 2.5 units/15 grams carbohydrate tid with meals.  Dispense: 15 mL; Refill: 3  - insulin aspart (NOVOLOG FLEXPEN) 100 UNIT/ML pen; Correction scale: 1 unit/50mg over 150. ie 1 unit for 150-200, 2 units for 201-250 etc  Dispense: 15 mL; Refill: 1  - ENDOCRINOLOGY ADULT REFERRAL      Marko Brink MD  [facility]'        SUBJECTIVE:   Bandar Cadena is a 23 year old male who presents to clinic today for the following health issues:    Diabetes Follow-up  Patient is checking blood sugars: three times daily.   Blood sugar testing frequency justification: Uncontrolled diabetes  Results are as follows:         am - 150-200         lunchtime - 150-200         suppertime - 120-160, before bed 120-160    Diabetic concerns: None     Symptoms of hypoglycemia (low blood sugar): none     Paresthesias (numbness or burning in feet) or sores: No     Date of last diabetic eye exam: Due for one.      Don't typically exceed 200 in blood sugars.   Last A1c at 12.5% from 7/2018.  He was in DKA at the time.  There was no obvious cause of DKA identified,  "but patient reports new job and insulin regimen was hard to adhere to due to work schedule    He currently works at the [employer].    His insulin schedule is more steady now, the mealtime insulin is sometimes where he forget.   Very rare lows. He gets shaky        BP Readings from Last 2 Encounters:   07/22/18 116/79     Hemoglobin A1C (%)   Date Value   07/20/2018 12.5 (H)       Diabetes Management Resources    Amount of exercise or physical activity: 4-5 days/week for an average of 15-30 minutes    Problems taking medications regularly: No    Medication side effects: none    Diet: regular (no restrictions)        Problem list and histories reviewed & adjusted, as indicated.  Additional history: as documented    Patient Active Problem List   Diagnosis     DKA (diabetic ketoacidoses) (H)     History reviewed. No pertinent surgical history.    Social History     Tobacco Use     Smoking status: Never Smoker     Smokeless tobacco: Never Used   Substance Use Topics     Alcohol use: No     Family History   Problem Relation Age of Onset     No Known Problems Mother      No Known Problems Father            Reviewed and updated as needed this visit by clinical staff  Tobacco  Allergies  Meds  Med Hx  Surg Hx  Fam Hx  Soc Hx      Reviewed and updated as needed this visit by Provider         ROS:  Constitutional, HEENT, cardiovascular, pulmonary, gi and gu systems are negative, except as otherwise noted.    OBJECTIVE:     /60 (BP Location: Right arm, Patient Position: Sitting, Cuff Size: Adult Regular)   Pulse 113   Temp 98  F (36.7  C) (Oral)   Resp 20   Ht 1.867 m (6' 1.5\")   Wt 69.7 kg (153 lb 11.2 oz)   SpO2 99%   BMI 20.00 kg/m    Body mass index is 20 kg/m .  GENERAL: healthy, alert and no distress  NECK: no adenopathy, no asymmetry, masses, or scars and thyroid normal to palpation  RESP: lungs clear to auscultation - no rales, rhonchi or wheezes  CV: regular rate and rhythm, normal S1 S2, no S3 or " S4, no murmur, click or rub, no peripheral edema and peripheral pulses strong  ABDOMEN: soft, nontender, no hepatosplenomegaly, no masses and bowel sounds normal  MS: no gross musculoskeletal defects noted, no edema  Diabetic foot exam:  no trophic changes or ulcerative lesions and normal sensory exam    Diagnostic Test Results:  none

## 2019-02-08 LAB
ANION GAP SERPL CALCULATED.3IONS-SCNC: 6 MMOL/L (ref 3–14)
BUN SERPL-MCNC: 20 MG/DL (ref 7–30)
CALCIUM SERPL-MCNC: 8.9 MG/DL (ref 8.5–10.1)
CHLORIDE SERPL-SCNC: 105 MMOL/L (ref 94–109)
CO2 SERPL-SCNC: 28 MMOL/L (ref 20–32)
CREAT SERPL-MCNC: 0.76 MG/DL (ref 0.66–1.25)
CREAT UR-MCNC: 217 MG/DL
GFR SERPL CREATININE-BSD FRML MDRD: >90 ML/MIN/{1.73_M2}
GLUCOSE SERPL-MCNC: 171 MG/DL (ref 70–99)
MICROALBUMIN UR-MCNC: 83 MG/L
MICROALBUMIN/CREAT UR: 38.25 MG/G CR (ref 0–17)
POTASSIUM SERPL-SCNC: 3.8 MMOL/L (ref 3.4–5.3)
SODIUM SERPL-SCNC: 139 MMOL/L (ref 133–144)

## 2019-04-02 DIAGNOSIS — E10.65 TYPE 1 DIABETES MELLITUS WITH HYPERGLYCEMIA (H): ICD-10-CM

## 2019-04-02 NOTE — TELEPHONE ENCOUNTER
"PHARMACY IS REQUESTING 90 DAY REFILLS    Requested Prescriptions   Pending Prescriptions Disp Refills     insulin aspart (NOVOLOG FLEXPEN) 100 UNIT/ML pen  Last Written Prescription Date:  19  Last Fill Quantity: 12 Ml,  # refills: 3   Last office visit: 2019 with prescribing provider:  19   Future Office Visit:     15 mL 3     Si.5 units/15 grams carbohydrate tid with meals.    Short Acting Insulin Protocol Failed - 2019  3:16 PM       Failed - LDL on file in past 12 months    No lab results found.         Passed - Blood pressure less than 140/90 in past 6 months    BP Readings from Last 3 Encounters:   19 110/60   18 116/79                Passed - Microalbumin on file in past 12 months    Recent Labs   Lab Test 19  1046   MICROL 83   UMALCR 38.25*            Passed - Serum creatinine on file in past 12 months    Recent Labs   Lab Test 19  1046   CR 0.76            Passed - HgbA1C in past 3 or 6 months    If HgbA1C is 8 or greater, it needs to be on file within the past 3 months.  If less than 8, must be on file within the past 6 months.     Recent Labs   Lab Test 19  1046   A1C 12.7*            Passed - Medication is active on med list       Passed - Patient is age 18 or older       Passed - Recent (6 mo) or future (30 days) visit within the authorizing provider's specialty    Patient had office visit in the last 6 months or has a visit in the next 30 days with authorizing provider or within the authorizing provider's specialty.  See \"Patient Info\" tab in inbasket, or \"Choose Columns\" in Meds & Orders section of the refill encounter.              "

## 2019-04-02 NOTE — LETTER
[facility]  [address]  [facility] [address]  [phone]        April 5, 2019      Bandar Cadena  [address]   [facility] [address]          Dear Bandar Cadena    APPOINTMENT REMINDER:   Our records indicates that it is time for you to be seen for a recheck.     Your current medication request will be approved for one refill but you will need to be seen before any additional refills can be approved.    Taking care of your health is important to us, and ongoing visits with a provider are vital to your care. Dr Brink has moved out of the area and is no longer at [facility], but there are several providers taking new patients that you may schedule with.     We look forward to seeing you in the near future.  You may call our office at [phone] to schedule a visit.    Please disregard this notice if you have already made an appointment.      Sincerely,      [facility]  Nurse Refill Team

## 2019-04-04 NOTE — TELEPHONE ENCOUNTER
Routing refill request to provider for review/approval because:  Labs out of range:  A1C  Labs not current:  Lipid panel    Letter was mailed to home 2-18-19 d/t unable to contact patient re: lab results (see result note 2-7-19) .  Patient has not called back.    Please advise, thanks.     This request is forwarded to Dr Cobb who covers Dr Brink patients with the letters O, P, Q, R

## 2019-04-04 NOTE — TELEPHONE ENCOUNTER
Call the patient and advise she needs to schedule an appointment with a new provider since Dr. Brink left this.  I will do one refill until that time

## 2019-04-19 ENCOUNTER — OFFICE VISIT (OUTPATIENT)
Dept: INTERNAL MEDICINE | Facility: CLINIC | Age: 24
End: 2019-04-19
Payer: COMMERCIAL

## 2019-04-19 VITALS
OXYGEN SATURATION: 97 % | HEIGHT: 73 IN | SYSTOLIC BLOOD PRESSURE: 120 MMHG | HEART RATE: 74 BPM | TEMPERATURE: 98 F | WEIGHT: 151 LBS | BODY MASS INDEX: 20.01 KG/M2 | RESPIRATION RATE: 12 BRPM | DIASTOLIC BLOOD PRESSURE: 70 MMHG

## 2019-04-19 DIAGNOSIS — E10.9 TYPE 1 DIABETES MELLITUS WITHOUT COMPLICATION (H): Primary | ICD-10-CM

## 2019-04-19 DIAGNOSIS — J45.901 MILD ASTHMA EXACERBATION: ICD-10-CM

## 2019-04-19 PROCEDURE — 99213 OFFICE O/P EST LOW 20 MIN: CPT | Performed by: FAMILY MEDICINE

## 2019-04-19 RX ORDER — INSULIN GLARGINE 100 [IU]/ML
25 INJECTION, SOLUTION SUBCUTANEOUS AT BEDTIME
COMMUNITY
End: 2019-04-19

## 2019-04-19 RX ORDER — ALBUTEROL SULFATE 90 UG/1
2 AEROSOL, METERED RESPIRATORY (INHALATION) EVERY 4 HOURS PRN
Qty: 18 G | Refills: 5 | Status: SHIPPED | OUTPATIENT
Start: 2019-04-19

## 2019-04-19 RX ORDER — INSULIN GLARGINE 100 [IU]/ML
25 INJECTION, SOLUTION SUBCUTANEOUS AT BEDTIME
Qty: 18 ML | Refills: 1 | Status: SHIPPED | OUTPATIENT
Start: 2019-04-19 | End: 2019-08-07

## 2019-04-19 ASSESSMENT — MIFFLIN-ST. JEOR: SCORE: 1723.81

## 2019-04-19 NOTE — NURSING NOTE
"/70   Pulse 74   Temp 98  F (36.7  C) (Oral)   Resp 12   Ht 1.854 m (6' 1\")   Wt 68.5 kg (151 lb)   SpO2 97%   BMI 19.92 kg/m      "

## 2019-04-19 NOTE — PROGRESS NOTES
SUBJECTIVE:   Bandar Cadena is a 23 year old male who presents to clinic today for the following   health issues:      Diabetes Follow-up    Patient is checking blood sugars: four times daily.    Blood sugar testing frequency justification: Adjustment of medication(s)  Results are as follows:         am - 150-250         lunchtime - 150-250         suppertime - 150-250         bedtime - 150-250    Diabetic concerns: None     Symptoms of hypoglycemia (low blood sugar): none     Paresthesias (numbness or burning in feet) or sores: No     Date of last diabetic eye exam: 1 year ago    BP Readings from Last 2 Encounters:   02/07/19 110/60   07/22/18 116/79     Hemoglobin A1C (%)   Date Value   02/07/2019 12.7 (H)   07/20/2018 12.5 (H)       Diabetes Management Resources    Amount of exercise or physical activity: None    Problems taking medications regularly: No    Medication side effects: none    Diet: low fat/cholesterol and carbohydrate counting        Patient has a history of diabetes since 2004.    Visit today is mainly for a form he has for driving.  He has not had any blackout spells.  Has not been in the hospital in the last year.  Has not had visual problems or sensory problems.      Patient Active Problem List   Diagnosis     DKA (diabetic ketoacidoses) (H)       Current Outpatient Medications   Medication Sig Dispense Refill     albuterol (PROAIR HFA/PROVENTIL HFA/VENTOLIN HFA) 108 (90 Base) MCG/ACT inhaler Inhale 2 puffs into the lungs every 4 hours as needed for shortness of breath / dyspnea or wheezing 18 g 5     enalapril (VASOTEC) 10 MG tablet Take 10 mg by mouth daily       ibuprofen (ADVIL/MOTRIN) 200 MG tablet Take 200 mg by mouth every 6 hours as needed (headache)       insulin aspart (NOVOLOG FLEXPEN) 100 UNIT/ML pen 2.5 units/15 grams carbohydrate tid with meals. 15 mL 0     insulin aspart (NOVOLOG FLEXPEN) 100 UNIT/ML pen Correction scale: 1 unit/50mg over 150. ie 1 unit for 150-200, 2 units for  "201-250 etc 15 mL 1     insulin glargine (BASAGLAR KWIKPEN) 100 UNIT/ML pen Inject 25 Units Subcutaneous At Bedtime 18 mL 1       REVIEW OF SYSTEMS    Weight stable.  No breathing problems.  No chest pain or palpitations or syncope.  No abdominal pain.  No neuropathy symptoms or incoordination.  No visual or speech problems.      Past Medical History:   Diagnosis Date     Asthma      Diabetes mellitus type 1 (H)        EXAM  /70   Pulse 74   Temp 98  F (36.7  C) (Oral)   Resp 12   Ht 1.854 m (6' 1\")   Wt 68.5 kg (151 lb)   SpO2 97%   BMI 19.92 kg/m      Thin, well-appearing young man.  HEENT and neck unremarkable.  Nonlabored breathing.  Cardiac rhythm regular.  Motor and gait normal.    Recent A1c reports were unfavorable and in the 12's.  We did go over his insulin dosages.      (E10.9) Type 1 diabetes mellitus without complication (H)  (primary encounter diagnosis)  Comment:     Control is poor.  I would like him to see endocrinology.  My initial thought would be that he go up on his long-acting insulin.  I do not see a problem with him driving at this point.  Form completed.    Plan: ENDOCRINOLOGY ADULT REFERRAL, insulin glargine         (BASAGLAR KWIKPEN) 100 UNIT/ML pen            (J45.901) Mild asthma exacerbation  Comment: Refill.  Plan: albuterol (PROAIR HFA/PROVENTIL HFA/VENTOLIN         HFA) 108 (90 Base) MCG/ACT inhaler                    "

## 2019-07-31 DIAGNOSIS — E10.9 TYPE 1 DIABETES MELLITUS WITHOUT COMPLICATION (H): ICD-10-CM

## 2019-07-31 NOTE — TELEPHONE ENCOUNTER
"Requested Prescriptions   Pending Prescriptions Disp Refills     insulin glargine (BASAGLAR KWIKPEN) 100 UNIT/ML pen 18 mL 1     Sig: Inject 25 Units Subcutaneous At Bedtime       Long Acting Insulin Protocol Failed - 7/31/2019 11:59 AM        Failed - LDL on file in past 12 months     No lab results found.          Failed - HgbA1C in past 3 or 6 months     If HgbA1C is 8 or greater, it needs to be on file within the past 3 months.  If less than 8, must be on file within the past 6 months.     Recent Labs   Lab Test 02/07/19  1046   A1C 12.7*             Passed - Blood pressure less than 140/90 in past 6 months     BP Readings from Last 3 Encounters:   04/19/19 120/70   02/07/19 110/60   07/22/18 116/79                 Passed - Microalbumin on file in past 12 months     Recent Labs   Lab Test 02/07/19  1046   MICROL 83   UMALCR 38.25*             Passed - Serum creatinine on file in past 12 months     Recent Labs   Lab Test 02/07/19  1046   CR 0.76             Passed - Medication is active on med list        Passed - Patient is age 18 or older        Passed - Recent (6 mo) or future (30 days) visit within the authorizing provider's specialty     Patient had office visit in the last 6 months or has a visit in the next 30 days with authorizing provider or within the authorizing provider's specialty.  See \"Patient Info\" tab in inbasket, or \"Choose Columns\" in Meds & Orders section of the refill encounter.              "

## 2019-08-01 NOTE — TELEPHONE ENCOUNTER
Routing refill request to provider for review/approval because:  Labs out of range:  A1C  Labs not current:

## 2019-08-07 RX ORDER — INSULIN GLARGINE 100 [IU]/ML
25 INJECTION, SOLUTION SUBCUTANEOUS AT BEDTIME
Qty: 18 ML | Refills: 1 | Status: SHIPPED | OUTPATIENT
Start: 2019-08-07 | End: 2020-02-13

## 2019-12-06 DIAGNOSIS — E10.9 TYPE 1 DIABETES MELLITUS WITHOUT COMPLICATION (H): ICD-10-CM

## 2019-12-06 RX ORDER — INSULIN GLARGINE 100 [IU]/ML
25 INJECTION, SOLUTION SUBCUTANEOUS AT BEDTIME
Qty: 18 ML | Refills: 1 | Status: CANCELLED | OUTPATIENT
Start: 2019-12-06

## 2019-12-06 NOTE — TELEPHONE ENCOUNTER
"Requested Prescriptions   Pending Prescriptions Disp Refills     insulin glargine (BASAGLAR KWIKPEN) 100 UNIT/ML pen  Last Written Prescription Date:  8/7/19  Last Fill Quantity: 18 mL ,  # refills: 1   Last office visit: 4/19/2019 with prescribing provider: with Dr. Cantor   Future Office Visit:     18 mL 1     Sig: Inject 25 Units Subcutaneous At Bedtime       Long Acting Insulin Protocol Failed - 12/6/2019 12:02 PM        Failed - Blood pressure less than 140/90 in past 6 months     BP Readings from Last 3 Encounters:   04/19/19 120/70   02/07/19 110/60   07/22/18 116/79                 Failed - LDL on file in past 12 months     No lab results found.          Failed - HgbA1C in past 3 or 6 months     If HgbA1C is 8 or greater, it needs to be on file within the past 3 months.  If less than 8, must be on file within the past 6 months.     Recent Labs   Lab Test 02/07/19  1046   A1C 12.7*             Failed - Recent (6 mo) or future (30 days) visit within the authorizing provider's specialty     Patient had office visit in the last 6 months or has a visit in the next 30 days with authorizing provider or within the authorizing provider's specialty.  See \"Patient Info\" tab in inbasket, or \"Choose Columns\" in Meds & Orders section of the refill encounter.            Passed - Microalbumin on file in past 12 months     Recent Labs   Lab Test 02/07/19  1046   MICROL 83   UMALCR 38.25*             Passed - Serum creatinine on file in past 12 months     Recent Labs   Lab Test 02/07/19  1046   CR 0.76             Passed - Medication is active on med list        Passed - Patient is age 18 or older          "

## 2019-12-09 NOTE — TELEPHONE ENCOUNTER
Patient has endocrinology appointment 2/26. Due for appointment with a primary care provider. Needs to establish care.    Left message for patient to call back.

## 2019-12-17 NOTE — TELEPHONE ENCOUNTER
Pt has not established with provider yet. Scheduled with Rebecca in February.     Last OV with Dr Cantor on 4/19/19.     Will route to Dr Angel for approval, since Type 1 diabetes.

## 2020-02-12 ENCOUNTER — HOSPITAL ENCOUNTER (OUTPATIENT)
Facility: CLINIC | Age: 25
Setting detail: OBSERVATION
Discharge: HOME | End: 2020-02-13
Attending: EMERGENCY MEDICINE | Admitting: HOSPITALIST
Payer: COMMERCIAL

## 2020-02-12 DIAGNOSIS — E10.65 TYPE 1 DIABETES MELLITUS WITH HYPERGLYCEMIA (H): ICD-10-CM

## 2020-02-12 DIAGNOSIS — E10.9 TYPE 1 DIABETES MELLITUS WITHOUT COMPLICATION (H): ICD-10-CM

## 2020-02-12 LAB
ALBUMIN SERPL-MCNC: 4.2 G/DL (ref 3.4–5)
ALP SERPL-CCNC: 83 U/L (ref 40–150)
ALT SERPL W P-5'-P-CCNC: 24 U/L (ref 0–70)
ANION GAP SERPL CALCULATED.3IONS-SCNC: 13 MMOL/L (ref 3–14)
ANION GAP SERPL CALCULATED.3IONS-SCNC: 7 MMOL/L (ref 3–14)
AST SERPL W P-5'-P-CCNC: 17 U/L (ref 0–45)
BASE DEFICIT BLDV-SCNC: 4.4 MMOL/L
BASE DEFICIT BLDV-SCNC: 5.5 MMOL/L
BASOPHILS # BLD AUTO: 0.1 10E9/L (ref 0–0.2)
BASOPHILS NFR BLD AUTO: 1.5 %
BILIRUB SERPL-MCNC: 0.9 MG/DL (ref 0.2–1.3)
BUN SERPL-MCNC: 18 MG/DL (ref 7–30)
BUN SERPL-MCNC: 21 MG/DL (ref 7–30)
CALCIUM SERPL-MCNC: 7.6 MG/DL (ref 8.5–10.1)
CALCIUM SERPL-MCNC: 8.8 MG/DL (ref 8.5–10.1)
CHLORIDE SERPL-SCNC: 100 MMOL/L (ref 94–109)
CHLORIDE SERPL-SCNC: 105 MMOL/L (ref 94–109)
CO2 SERPL-SCNC: 19 MMOL/L (ref 20–32)
CO2 SERPL-SCNC: 26 MMOL/L (ref 20–32)
CREAT SERPL-MCNC: 0.69 MG/DL (ref 0.66–1.25)
CREAT SERPL-MCNC: 0.75 MG/DL (ref 0.66–1.25)
DIFFERENTIAL METHOD BLD: NORMAL
EOSINOPHIL # BLD AUTO: 0.4 10E9/L (ref 0–0.7)
EOSINOPHIL NFR BLD AUTO: 4.5 %
ERYTHROCYTE [DISTWIDTH] IN BLOOD BY AUTOMATED COUNT: 11.6 % (ref 10–15)
ETHANOL SERPL-MCNC: <0.01 G/DL
GFR SERPL CREATININE-BSD FRML MDRD: >90 ML/MIN/{1.73_M2}
GFR SERPL CREATININE-BSD FRML MDRD: >90 ML/MIN/{1.73_M2}
GLUCOSE BLDC GLUCOMTR-MCNC: 198 MG/DL (ref 70–99)
GLUCOSE BLDC GLUCOMTR-MCNC: 201 MG/DL (ref 70–99)
GLUCOSE BLDC GLUCOMTR-MCNC: 341 MG/DL (ref 70–99)
GLUCOSE SERPL-MCNC: 207 MG/DL (ref 70–99)
GLUCOSE SERPL-MCNC: 255 MG/DL (ref 70–99)
HBA1C MFR BLD: 11.2 % (ref 0–5.6)
HCO3 BLDV-SCNC: 21 MMOL/L (ref 21–28)
HCO3 BLDV-SCNC: 23 MMOL/L (ref 21–28)
HCT VFR BLD AUTO: 49.6 % (ref 40–53)
HGB BLD-MCNC: 17.6 G/DL (ref 13.3–17.7)
IMM GRANULOCYTES # BLD: 0 10E9/L (ref 0–0.4)
IMM GRANULOCYTES NFR BLD: 0.1 %
KETONES BLD-SCNC: 3.5 MMOL/L (ref 0–0.6)
KETONES BLD-SCNC: 5.4 MMOL/L (ref 0–0.6)
LACTATE BLD-SCNC: 1.2 MMOL/L (ref 0.7–2)
LYMPHOCYTES # BLD AUTO: 3.2 10E9/L (ref 0.8–5.3)
LYMPHOCYTES NFR BLD AUTO: 39.5 %
MAGNESIUM SERPL-MCNC: 2 MG/DL (ref 1.6–2.3)
MCH RBC QN AUTO: 30.2 PG (ref 26.5–33)
MCHC RBC AUTO-ENTMCNC: 35.5 G/DL (ref 31.5–36.5)
MCV RBC AUTO: 85 FL (ref 78–100)
MONOCYTES # BLD AUTO: 0.6 10E9/L (ref 0–1.3)
MONOCYTES NFR BLD AUTO: 7.1 %
NEUTROPHILS # BLD AUTO: 3.8 10E9/L (ref 1.6–8.3)
NEUTROPHILS NFR BLD AUTO: 47.3 %
NRBC # BLD AUTO: 0 10*3/UL
NRBC BLD AUTO-RTO: 0 /100
O2/TOTAL GAS SETTING VFR VENT: NORMAL %
OXYHGB MFR BLDV: 79 %
PCO2 BLDV: 41 MM HG (ref 40–50)
PCO2 BLDV: 56 MM HG (ref 40–50)
PH BLDV: 7.23 PH (ref 7.32–7.43)
PH BLDV: 7.33 PH (ref 7.32–7.43)
PLATELET # BLD AUTO: 297 10E9/L (ref 150–450)
PO2 BLDV: 20 MM HG (ref 25–47)
PO2 BLDV: 45 MM HG (ref 25–47)
POTASSIUM SERPL-SCNC: 3.5 MMOL/L (ref 3.4–5.3)
POTASSIUM SERPL-SCNC: 3.8 MMOL/L (ref 3.4–5.3)
PROT SERPL-MCNC: 8 G/DL (ref 6.8–8.8)
RBC # BLD AUTO: 5.82 10E12/L (ref 4.4–5.9)
SODIUM SERPL-SCNC: 133 MMOL/L (ref 133–144)
SODIUM SERPL-SCNC: 137 MMOL/L (ref 133–144)
WBC # BLD AUTO: 8.1 10E9/L (ref 4–11)

## 2020-02-12 PROCEDURE — 83605 ASSAY OF LACTIC ACID: CPT | Performed by: EMERGENCY MEDICINE

## 2020-02-12 PROCEDURE — 80053 COMPREHEN METABOLIC PANEL: CPT | Performed by: EMERGENCY MEDICINE

## 2020-02-12 PROCEDURE — 25800030 ZZH RX IP 258 OP 636: Performed by: EMERGENCY MEDICINE

## 2020-02-12 PROCEDURE — 82010 KETONE BODYS QUAN: CPT | Performed by: EMERGENCY MEDICINE

## 2020-02-12 PROCEDURE — 96361 HYDRATE IV INFUSION ADD-ON: CPT

## 2020-02-12 PROCEDURE — 80320 DRUG SCREEN QUANTALCOHOLS: CPT | Performed by: EMERGENCY MEDICINE

## 2020-02-12 PROCEDURE — 96360 HYDRATION IV INFUSION INIT: CPT

## 2020-02-12 PROCEDURE — 83036 HEMOGLOBIN GLYCOSYLATED A1C: CPT | Performed by: EMERGENCY MEDICINE

## 2020-02-12 PROCEDURE — 83735 ASSAY OF MAGNESIUM: CPT | Performed by: EMERGENCY MEDICINE

## 2020-02-12 PROCEDURE — 00000146 ZZHCL STATISTIC GLUCOSE BY METER IP

## 2020-02-12 PROCEDURE — 99219 ZZC INITIAL OBSERVATION CARE,LEVL II: CPT | Performed by: HOSPITALIST

## 2020-02-12 PROCEDURE — 82803 BLOOD GASES ANY COMBINATION: CPT | Mod: 91 | Performed by: EMERGENCY MEDICINE

## 2020-02-12 PROCEDURE — 80048 BASIC METABOLIC PNL TOTAL CA: CPT | Performed by: EMERGENCY MEDICINE

## 2020-02-12 PROCEDURE — 80307 DRUG TEST PRSMV CHEM ANLYZR: CPT | Performed by: EMERGENCY MEDICINE

## 2020-02-12 PROCEDURE — 93005 ELECTROCARDIOGRAM TRACING: CPT

## 2020-02-12 PROCEDURE — 82805 BLOOD GASES W/O2 SATURATION: CPT | Performed by: EMERGENCY MEDICINE

## 2020-02-12 PROCEDURE — 85025 COMPLETE CBC W/AUTO DIFF WBC: CPT | Performed by: EMERGENCY MEDICINE

## 2020-02-12 PROCEDURE — 99285 EMERGENCY DEPT VISIT HI MDM: CPT | Mod: 25

## 2020-02-12 RX ORDER — DEXTROSE MONOHYDRATE 25 G/50ML
25-50 INJECTION, SOLUTION INTRAVENOUS
Status: DISCONTINUED | OUTPATIENT
Start: 2020-02-12 | End: 2020-02-13

## 2020-02-12 RX ADMIN — SODIUM CHLORIDE 1000 ML: 9 INJECTION, SOLUTION INTRAVENOUS at 17:58

## 2020-02-12 RX ADMIN — SODIUM CHLORIDE 1000 ML: 9 INJECTION, SOLUTION INTRAVENOUS at 21:08

## 2020-02-12 ASSESSMENT — ENCOUNTER SYMPTOMS
DIARRHEA: 0
NAUSEA: 1
CHILLS: 0
FEVER: 0
FATIGUE: 1
VOMITING: 1
ABDOMINAL PAIN: 0

## 2020-02-12 ASSESSMENT — MIFFLIN-ST. JEOR: SCORE: 1714.28

## 2020-02-12 NOTE — ED TRIAGE NOTES
Pt having NV that started around noon today. Pt is a type I diabetic and BG was 588 at noon. Pt gave himselg 20 units of novolog. Pt tested urine at home and it was positive for ketones. Hx of DKA in past.

## 2020-02-12 NOTE — ED PROVIDER NOTES
"  History     Chief Complaint:  Diabetes, Nausea, and Vomiting      HPI   Bandar Cadena is a 24 year old male with a history of DKA and diabetes mellitus type 1 who presents to the emergency department for evaluation of diabetes, nausea, and vomiting. At 1200, the patient began to have nausea and vomited once. His glucose level was 588 at 1230. He then administered 20 units of novolog. He notes that this dropped about 100 points every hour since 1230.  When he tested his urine for ketones at home, the test came back positive. The patient reports fatigue, nausea, and vomiting x 1 though otherwise denies any other concerns. He does not take any oral medications for his diabetes. He has not vomited recently other than today. The patient denies chest pain, abdominal pain, diarrhea, fever, or chills. He reports he does not check his blood sugar regularly.    Allergies:  No known drug allergies     Medications:    Proair  Vasotec  Lantus    Past Medical History:    DKA  Asthma  Diabetes mellitus type 1    Past Surgical History:    The patient does not have any pertinent past surgical history.    Family History:    No past pertinent family history.    Social History:  The patient presents today with his wife, Kim.  Never smoker  Negative for alcohol use.  Negative for drug use.  Marital Status:      Review of Systems   Constitutional: Positive for fatigue. Negative for chills and fever.   Cardiovascular: Negative for chest pain.   Gastrointestinal: Positive for nausea and vomiting. Negative for abdominal pain and diarrhea.   All other systems reviewed and are negative.      Physical Exam     Patient Vitals for the past 24 hrs:   BP Temp Temp src Pulse Heart Rate Resp SpO2 Height Weight   02/13/20 0729 106/55 97.7  F (36.5  C) Oral -- 95 16 98 % -- --   02/13/20 0445 119/62 97.9  F (36.6  C) Oral 95 -- 16 97 % -- --   02/13/20 0022 105/63 96.7  F (35.9  C) Oral 92 -- 16 98 % 1.854 m (6' 1\") 70.4 kg (155 lb 1.6 oz) " "  02/12/20 2345 115/76 -- -- 113 107 -- 100 % -- --   02/12/20 2330 111/66 -- -- 93 93 -- 99 % -- --   02/12/20 2315 110/70 -- -- 92 93 -- 98 % -- --   02/12/20 2300 107/63 -- -- 70 93 -- 99 % -- --   02/12/20 2245 105/67 -- -- 88 89 -- 97 % -- --   02/12/20 2230 101/66 -- -- 87 89 -- 100 % -- --   02/12/20 2215 105/68 -- -- 93 93 -- 98 % -- --   02/12/20 2200 108/69 -- -- 84 84 -- 100 % -- --   02/12/20 2145 99/70 -- -- 87 88 -- 100 % -- --   02/12/20 2130 104/64 -- -- 86 88 -- 99 % -- --   02/12/20 2115 103/63 -- -- 89 85 -- 99 % -- --   02/12/20 2100 102/65 -- -- 85 89 -- 98 % -- --   02/12/20 2045 101/63 -- -- 85 84 -- 98 % -- --   02/12/20 2030 99/61 -- -- 80 81 -- 99 % -- --   02/12/20 2015 104/64 -- -- 74 81 -- 97 % -- --   02/12/20 2000 95/58 -- -- 76 84 -- 97 % -- --   02/12/20 1945 98/58 -- -- 81 80 -- 98 % -- --   02/12/20 1930 97/61 -- -- 80 87 -- 98 % -- --   02/12/20 1915 96/56 -- -- 73 78 -- 100 % -- --   02/12/20 1900 95/57 -- -- 78 78 -- 100 % -- --   02/12/20 1845 105/66 -- -- 78 86 -- 100 % -- --   02/12/20 1830 92/59 -- -- 77 81 -- 100 % -- --   02/12/20 1815 97/63 -- -- 82 84 -- 100 % -- --   02/12/20 1800 101/64 -- -- 81 86 -- -- -- --   02/12/20 1736 -- 97.6  F (36.4  C) Oral 90 -- 20 100 % 1.854 m (6' 1\") 68 kg (150 lb)     Physical Exam  General:              Well-nourished              Speaking in full sentences              Well appearing              Not ketotic odor  Eyes:              Conjunctiva without injection or scleral icterus  ENT:              Dry mucous membranes              Nares patent              Pinnae normal  Neck:              Full ROM              No stiffness appreciated  Resp:              Lungs CTAB              No crackles, wheezing or audible rubs              Good air movement  CV:                    Normal rate, regular rhythm              S1 and S2 present              No murmur, gallop or rub  GI:              BS present              Abdomen soft without " distention              Non-tender to light and deep palpation              No guarding or rebound tenderness  Skin:              Warm, dry, well perfused              No rashes or open wounds on exposed skin  MSK:              Moves all extremities              No focal deformities or swelling  Neuro:              Alert              Answers questions appropriately              Moves all extremities equally              Gait stable  Psych:              Normal affect, normal mood    Emergency Department Course     ECG:  Time: 1736  Vent. Rate 88 bpm. NY interval 152. QRS duration 98. QT/QTc 358/433. P-R-T axis 76 88 60.  Normal sinus rhythm  Normal ECG  Read time: 1740    Laboratory:  Laboratory findings were communicated with the patient who voiced understanding of the findings.    CBC: WNL. (WBC 8.1, HGB 17.6, )   CMP: Glucose 207 (H) o/w WNL (Creatinine 0.75)    1741 Blood gas venous: Ph 7.23 (L), PCO2 56 (H), PO2 20 (L), Bicarbonate 23, Base Deficit 5.5    2005 Blood gas venous and oxyhgb: Ph 7.33, PCO2 41, PO2 45, Bicarbonate 21, FIO2 RA, Oxyhemoglobin 79, Base Deficit 4.4    Hemoglobin A1c 11.2 (H)    Magnesium 2.0    Lactic acid whole blood 1.2    1741 Ketone Beta-Hydroxybutyrate Quantitative 3.5 (HH)     Ketone Beta-Hydroxybutyrate Quantitative 5.4    Alcohol ethyl <0.01    1732 Glucose by meter 198 (H)     1955 Glucose by meter 201 (H)     Interventions:  1758 NS 1L IV    2108 NS 1L IV    Emergency Department Course:    1731 Nursing notes and vitals reviewed.    1732 IV was inserted and blood was drawn for laboratory testing, results above.    1736 EKG obtained as noted above.    1740 I performed an exam of the patient as documented above.     1741 Blood was drawn for laboratory testing, results above.    1955 Blood was drawn for laboratory testing, results above.    2001 Patient rechecked and updated.     2005 Blood was drawn for laboratory testing, results above.    2052 I spoke with the Hospitalist  service from [facility] regarding patient's presentation, findings, and plan of care.    2107 Patient rechecked and updated.     Patient re-evaluated.  Given increasing BS, increasing AG, and decreasing bicarb, will plan for hospital observation for close monitoring as patient may trending towards DKA.  Impression & Plan     Medical Decision Making:  Bandar Cadena is a 24 year old male who presents to the emergency department today with concerns for diabetes, as well as nausea and 1 episode of emesis.  Present and work-up are concerning for poorly controlled type I diabetes, with suspicion of early progression towards DKA.  Pt reports BS of 588 prior to arrival, for which he administered 20 U insulin, and also noted ketones in urine.  Initial labs reveal BS of 207, though absence of elevated anion gap and normal bicarb.  IVF initiated.  VBG obtained, revealing pH of 7.23, though most consistent with respiratory acidosis given elevated pCO2 and normal bicarb.  This does not clearly fit with clinical picture as patient is without evidence of hypoventilation, nor AMS.  Repeat VBG after 1L normal saline with pH of 7.33, normal bicarb and normal pCO2.  Labs notable for elevated ketones at 3.4, as well as Hgb A1C of 11.2, suggesting poor control at home.  Given that patient had taken 20 U of insulin prior to presentation, he was observed in the ED, and repeat labs were obtained about 4-5 hours after presentation.  His BS is climbing, as is his anion gap, with his bicarb declining.  Given these findings, will plan for hospital observation for IVF, insulin treatment, and close monitoring.  Case discussed with hospitalist, who has accepted the patient for admission.  At this time, will plan for subcutaneous insulin rather than insulin drip, and continue close monitoring.  Patient and wife updated at bedside and questions were answered prior to admission.    Discharge Diagnosis:    ICD-10-CM    1. Type 1 diabetes  mellitus with hyperglycemia (H) E10.65 Glucose by meter       Disposition:  Admit    Scribe Disclosure:  I, Christo Page, am serving as a scribe at 5:34 PM on 2/12/2020 to document services personally performed by Yasir Kohler MD based on my observations and the provider's statements to me.        Yasir Kohler MD  02/13/20 103

## 2020-02-13 VITALS
RESPIRATION RATE: 16 BRPM | HEIGHT: 73 IN | BODY MASS INDEX: 20.56 KG/M2 | OXYGEN SATURATION: 98 % | TEMPERATURE: 97.7 F | WEIGHT: 155.1 LBS | HEART RATE: 95 BPM | DIASTOLIC BLOOD PRESSURE: 55 MMHG | SYSTOLIC BLOOD PRESSURE: 106 MMHG

## 2020-02-13 DIAGNOSIS — E10.9 TYPE 1 DIABETES MELLITUS WITHOUT COMPLICATION (H): ICD-10-CM

## 2020-02-13 PROBLEM — R73.9 HYPERGLYCEMIA: Status: ACTIVE | Noted: 2020-02-13

## 2020-02-13 LAB
ANION GAP SERPL CALCULATED.3IONS-SCNC: 17 MMOL/L (ref 3–14)
BUN SERPL-MCNC: 15 MG/DL (ref 7–30)
CALCIUM SERPL-MCNC: 7.6 MG/DL (ref 8.5–10.1)
CHLORIDE SERPL-SCNC: 105 MMOL/L (ref 94–109)
CO2 SERPL-SCNC: 12 MMOL/L (ref 20–32)
CREAT SERPL-MCNC: 0.79 MG/DL (ref 0.66–1.25)
ERYTHROCYTE [DISTWIDTH] IN BLOOD BY AUTOMATED COUNT: 11.9 % (ref 10–15)
GFR SERPL CREATININE-BSD FRML MDRD: >90 ML/MIN/{1.73_M2}
GLUCOSE BLDC GLUCOMTR-MCNC: 328 MG/DL (ref 70–99)
GLUCOSE BLDC GLUCOMTR-MCNC: 330 MG/DL (ref 70–99)
GLUCOSE SERPL-MCNC: 316 MG/DL (ref 70–99)
HCT VFR BLD AUTO: 47.9 % (ref 40–53)
HGB BLD-MCNC: 15.9 G/DL (ref 13.3–17.7)
INTERPRETATION ECG - MUSE: NORMAL
MCH RBC QN AUTO: 29.7 PG (ref 26.5–33)
MCHC RBC AUTO-ENTMCNC: 33.2 G/DL (ref 31.5–36.5)
MCV RBC AUTO: 90 FL (ref 78–100)
PLATELET # BLD AUTO: 275 10E9/L (ref 150–450)
POTASSIUM SERPL-SCNC: 4.5 MMOL/L (ref 3.4–5.3)
RBC # BLD AUTO: 5.35 10E12/L (ref 4.4–5.9)
SODIUM SERPL-SCNC: 134 MMOL/L (ref 133–144)
WBC # BLD AUTO: 12.1 10E9/L (ref 4–11)

## 2020-02-13 PROCEDURE — 80048 BASIC METABOLIC PNL TOTAL CA: CPT | Performed by: HOSPITALIST

## 2020-02-13 PROCEDURE — 36415 COLL VENOUS BLD VENIPUNCTURE: CPT | Performed by: HOSPITALIST

## 2020-02-13 PROCEDURE — G0378 HOSPITAL OBSERVATION PER HR: HCPCS

## 2020-02-13 PROCEDURE — 96374 THER/PROPH/DIAG INJ IV PUSH: CPT

## 2020-02-13 PROCEDURE — 99217 ZZC OBSERVATION CARE DISCHARGE: CPT | Performed by: HOSPITALIST

## 2020-02-13 PROCEDURE — 25000131 ZZH RX MED GY IP 250 OP 636 PS 637: Performed by: HOSPITALIST

## 2020-02-13 PROCEDURE — 25800030 ZZH RX IP 258 OP 636: Performed by: HOSPITALIST

## 2020-02-13 PROCEDURE — 00000146 ZZHCL STATISTIC GLUCOSE BY METER IP

## 2020-02-13 PROCEDURE — 25000128 H RX IP 250 OP 636: Performed by: HOSPITALIST

## 2020-02-13 PROCEDURE — 85027 COMPLETE CBC AUTOMATED: CPT | Performed by: HOSPITALIST

## 2020-02-13 PROCEDURE — 96372 THER/PROPH/DIAG INJ SC/IM: CPT | Mod: XS

## 2020-02-13 RX ORDER — ONDANSETRON 2 MG/ML
4 INJECTION INTRAMUSCULAR; INTRAVENOUS EVERY 6 HOURS PRN
Status: DISCONTINUED | OUTPATIENT
Start: 2020-02-13 | End: 2020-02-13 | Stop reason: HOSPADM

## 2020-02-13 RX ORDER — SODIUM CHLORIDE 9 MG/ML
INJECTION, SOLUTION INTRAVENOUS CONTINUOUS
Status: DISCONTINUED | OUTPATIENT
Start: 2020-02-13 | End: 2020-02-13 | Stop reason: HOSPADM

## 2020-02-13 RX ORDER — POLYETHYLENE GLYCOL 3350 17 G/17G
17 POWDER, FOR SOLUTION ORAL DAILY PRN
Status: DISCONTINUED | OUTPATIENT
Start: 2020-02-13 | End: 2020-02-13 | Stop reason: HOSPADM

## 2020-02-13 RX ORDER — INSULIN GLARGINE 100 [IU]/ML
25 INJECTION, SOLUTION SUBCUTANEOUS AT BEDTIME
Qty: 18 ML | Refills: 1 | Status: SHIPPED | OUTPATIENT
Start: 2020-02-13 | End: 2020-02-26

## 2020-02-13 RX ORDER — DEXTROSE MONOHYDRATE 25 G/50ML
25-50 INJECTION, SOLUTION INTRAVENOUS
Status: DISCONTINUED | OUTPATIENT
Start: 2020-02-13 | End: 2020-02-13 | Stop reason: HOSPADM

## 2020-02-13 RX ORDER — BISACODYL 5 MG
15 TABLET, DELAYED RELEASE (ENTERIC COATED) ORAL DAILY PRN
Status: DISCONTINUED | OUTPATIENT
Start: 2020-02-13 | End: 2020-02-13 | Stop reason: HOSPADM

## 2020-02-13 RX ORDER — ONDANSETRON 4 MG/1
4 TABLET, ORALLY DISINTEGRATING ORAL EVERY 6 HOURS PRN
Status: DISCONTINUED | OUTPATIENT
Start: 2020-02-13 | End: 2020-02-13 | Stop reason: HOSPADM

## 2020-02-13 RX ORDER — ACETAMINOPHEN 325 MG/1
650 TABLET ORAL EVERY 4 HOURS PRN
Status: DISCONTINUED | OUTPATIENT
Start: 2020-02-13 | End: 2020-02-13 | Stop reason: HOSPADM

## 2020-02-13 RX ORDER — NALOXONE HYDROCHLORIDE 0.4 MG/ML
.1-.4 INJECTION, SOLUTION INTRAMUSCULAR; INTRAVENOUS; SUBCUTANEOUS
Status: DISCONTINUED | OUTPATIENT
Start: 2020-02-13 | End: 2020-02-13 | Stop reason: HOSPADM

## 2020-02-13 RX ORDER — NICOTINE POLACRILEX 4 MG
15-30 LOZENGE BUCCAL
Status: DISCONTINUED | OUTPATIENT
Start: 2020-02-13 | End: 2020-02-13 | Stop reason: HOSPADM

## 2020-02-13 RX ORDER — BISACODYL 5 MG
5 TABLET, DELAYED RELEASE (ENTERIC COATED) ORAL DAILY PRN
Status: DISCONTINUED | OUTPATIENT
Start: 2020-02-13 | End: 2020-02-13 | Stop reason: HOSPADM

## 2020-02-13 RX ORDER — BISACODYL 5 MG
10 TABLET, DELAYED RELEASE (ENTERIC COATED) ORAL DAILY PRN
Status: DISCONTINUED | OUTPATIENT
Start: 2020-02-13 | End: 2020-02-13 | Stop reason: HOSPADM

## 2020-02-13 RX ORDER — LIDOCAINE 40 MG/G
CREAM TOPICAL
Status: DISCONTINUED | OUTPATIENT
Start: 2020-02-13 | End: 2020-02-13 | Stop reason: HOSPADM

## 2020-02-13 RX ORDER — INSULIN GLARGINE 100 [IU]/ML
25 INJECTION, SOLUTION SUBCUTANEOUS AT BEDTIME
Qty: 18 ML | Refills: 1 | Status: CANCELLED | OUTPATIENT
Start: 2020-02-13

## 2020-02-13 RX ADMIN — INSULIN GLARGINE 25 UNITS: 100 INJECTION, SOLUTION SUBCUTANEOUS at 01:31

## 2020-02-13 RX ADMIN — ONDANSETRON HYDROCHLORIDE 4 MG: 2 INJECTION, SOLUTION INTRAMUSCULAR; INTRAVENOUS at 00:53

## 2020-02-13 RX ADMIN — INSULIN ASPART 2 UNITS: 100 INJECTION, SOLUTION INTRAVENOUS; SUBCUTANEOUS at 01:27

## 2020-02-13 RX ADMIN — SODIUM CHLORIDE: 9 INJECTION, SOLUTION INTRAVENOUS at 01:00

## 2020-02-13 ASSESSMENT — MIFFLIN-ST. JEOR: SCORE: 1737.41

## 2020-02-13 NOTE — DISCHARGE SUMMARY
[facility]  Hospitalist Discharge Summary       Date of Admission:  2/12/2020  Date of Discharge:  2/13/2020  Discharging Provider: Ned Francois MD      Discharge Diagnoses   hyperglycemia    Follow-ups Needed After Discharge   Follow-up Appointments     Follow-up and recommended labs and tests       Follow up with primary care provider, SHAWN FOX, within 7 days   for hospital follow- up.  No follow up labs or test are needed. Follow-up   with your endocrinologist next week.             Unresulted Labs Ordered in the Past 30 Days of this Admission     No orders found for last 31 day(s).      These results will be followed up by NA    Discharge Disposition   Discharged to home  Condition at discharge: Stable    Hospital Course   Bandar Cadena is a 24 year old male with history of type 1 diabetes noncompliant with his medications presents to the hospital with hyperglycemia. In the AM patient did not feel well, and had episode of nausea, and vomiting, he checked his sugars they were greater then 500 thus he gave himself insulin novolog 20 units. Afterwards, patient checked his blood sugars every hour, and also checked a urine ketone which was elevated thus he presented to the ED.  The patient is doing well he has minimal complaints nausea vomiting has resolved.  Patient is noncompliant with his medications he says he frequently forgets to take his insulin.  He is also running out of his prescriptions as he was changed over to his significant other's insurance.   Patient today feels well. No chest pain, sob, abdo pain, n/v/d. He is eating. He tells me his barriers to keeping control of his DM is not wanting to do all the checking/admin of insulin while in public at work. He used to be on a pump but his insurance stopped covering it. He now has new insurance. He has an appt with Endo next week. I offered to have him talk to our DM educator, but he did not feel it was necessary. He will  discharge home.      Consultations This Hospital Stay   None    Code Status   Full Code    Time Spent on this Encounter   I, Ned Francois MD, personally saw the patient today and spent greater than 30 minutes discharging this patient.       Ned Francois MD  [facility]  ______________________________________________________________________    Physical Exam   Vital Signs: Temp: 97.7  F (36.5  C) Temp src: Oral BP: 106/55 Pulse: 95 Heart Rate: 95 Resp: 16 SpO2: 98 % O2 Device: None (Room air)    Weight: 155 lbs 1.6 oz  Constitutional: awake, alert, cooperative, no apparent distress, and appears stated age  Eyes: Lids and lashes normal, pupils equal, round and reactive to light, extra ocular muscles intact, sclera clear, conjunctiva normal  ENT: Normocephalic, without obvious abnormality, atraumatic, sinuses nontender on palpation, external ears without lesions, oral pharynx with moist mucous membranes, tonsils without erythema or exudates, gums normal and good dentition.  Respiratory: No increased work of breathing, good air exchange, clear to auscultation bilaterally, no crackles or wheezing  Cardiovascular: Normal apical impulse, regular rate and rhythm, normal S1 and S2, no S3 or S4, and no murmur noted  GI: No scars, normal bowel sounds, soft, non-distended, non-tender, no masses palpated, no hepatosplenomegally  Skin: no bruising or bleeding  Musculoskeletal: no lower extremity pitting edema present       Primary Care Physician   SHAWN FOX    Discharge Orders      Reason for your hospital stay    hyperglycemia     Follow-up and recommended labs and tests     Follow up with primary care provider, SHAWN FOX, within 7 days for hospital follow- up.  No follow up labs or test are needed. Follow-up with your endocrinologist next week.     Activity    Your activity upon discharge: activity as tolerated     Diet    Follow this diet upon discharge: Orders Placed This Encounter       Combination Diet Regular Diet Adult; 7583-2263 Calories: Moderate Consistent CHO (4-6 CHO units/meal)       Significant Results and Procedures   Most Recent 2 LFT's:  Recent Labs   Lab Test 02/12/20  1741 07/20/18  0509   AST 17 33   ALT 24 59   ALKPHOS 83 151*   BILITOTAL 0.9 0.4   ,   Results for orders placed or performed during the hospital encounter of 07/20/18   Chest XR,  PA & LAT    Narrative    XR CHEST 2 VW   7/20/2018 4:07 AM     INDICATION: DKA.    COMPARISON: None.      Impression    IMPRESSION: No infiltrates or other acute findings. Heart size is  within normal limits.    CODI COLEY MD       Discharge Medications   Current Discharge Medication List      CONTINUE these medications which have NOT CHANGED    Details   albuterol (PROAIR HFA/PROVENTIL HFA/VENTOLIN HFA) 108 (90 Base) MCG/ACT inhaler Inhale 2 puffs into the lungs every 4 hours as needed for shortness of breath / dyspnea or wheezing  Qty: 18 g, Refills: 5    Associated Diagnoses: Mild asthma exacerbation      ibuprofen (ADVIL/MOTRIN) 200 MG tablet Take 200 mg by mouth every 6 hours as needed (headache)      !! insulin aspart (NOVOLOG FLEXPEN) 100 UNIT/ML pen 2 units/15 grams carbohydrate tid with meals. An average of 10-15 units with every meal      !! insulin aspart (NOVOLOG FLEXPEN) 100 UNIT/ML pen Correction scale: 1 unit/50mg over 150. ie 1 unit for 150-200, 2 units for 201-250 etc  Qty: 15 mL, Refills: 1    Associated Diagnoses: Type 1 diabetes mellitus with hyperglycemia (H)      insulin glargine (BASAGLAR KWIKPEN) 100 UNIT/ML pen Inject 25 Units Subcutaneous At Bedtime  Qty: 18 mL, Refills: 1    Comments: If Basaglar is not covered by insurance, may substitute Lantus at same dose and frequency.    Associated Diagnoses: Type 1 diabetes mellitus without complication (H)       !! - Potential duplicate medications found. Please discuss with provider.        Allergies   No Known Allergies

## 2020-02-13 NOTE — TELEPHONE ENCOUNTER
"Requested Prescriptions   Pending Prescriptions Disp Refills     insulin glargine (BASAGLAR KWIKPEN) 100 UNIT/ML pen 18 mL 1     Sig: Inject 25 Units Subcutaneous At Bedtime   Last Written Prescription Date:  08/07/2019  Last Fill Quantity: 18 mL,  # refills: 01   Last office visit: 4/19/2019 with prescribing provider:     Future Office Visit:   Next 5 appointments (look out 90 days)    Mar 02, 2020  4:20 PM CST  [facility] Cobb MD  [facility] [address]  [facility] [address]  [phone]           Long Acting Insulin Protocol Failed - 2/13/2020  9:41 AM        Failed - LDL on file in past 12 months     No lab results found.          Passed - Blood pressure less than 140/90 in past 6 months     BP Readings from Last 3 Encounters:   02/13/20 106/55   04/19/19 120/70   02/07/19 110/60                 Passed - Microalbumin on file in past 12 months     Recent Labs   Lab Test 02/07/19  1046   MICROL 83   UMALCR 38.25*             Passed - Serum creatinine on file in past 12 months     Recent Labs   Lab Test 02/13/20  0647   CR 0.79             Passed - HgbA1C in past 3 or 6 months     If HgbA1C is 8 or greater, it needs to be on file within the past 3 months.  If less than 8, must be on file within the past 6 months.     Recent Labs   Lab Test 02/12/20  1741   A1C 11.2*             Passed - Medication is active on med list        Passed - Patient is age 18 or older        Passed - Recent (6 mo) or future (30 days) visit within the authorizing provider's specialty     Patient had office visit in the last 6 months or has a visit in the next 30 days with authorizing provider or within the authorizing provider's specialty.  See \"Patient Info\" tab in inbasket, or \"Choose Columns\" in Meds & Orders section of the refill encounter.            "

## 2020-02-13 NOTE — PHARMACY-ADMISSION MEDICATION HISTORY
Admission medication history interview status for this patient is complete. See EPIC admission navigator for allergy information, prior to admission medications and immunization status.     Medication history interview source(s):Patient  Medication history resources (including written lists, pill bottles, clinic record): Care Everywhere records  Primary pharmacy: [facility]    Changes made to PTA medication list:  Added:  None  Deleted:  Enalapril & Lantus  Changed:  Novolog 2.5u/carb tid ---> 2u/carb tid    Actions taken by pharmacist (provider contacted, etc):None     Additional medication history information:None    Medication reconciliation/reorder completed by provider prior to medication history?  No     Do you take OTC medications (eg tylenol, ibuprofen, fish oil, eye/ear drops, etc)? No     For patients on insulin therapy: Yes  Lantus/levemir/NPH/toujeo/tresiba/Mix 70/30 dose:  Yes  Sliding scale Novolog: Yes  If Yes, do you have a baseline novolog pre-meal dose: 1 unit/50mg over 150.  Patients eat three meals a day: Yes  How many episodes of hypoglycemia do you have per week: 0-1  How many missed doses do you have per week: 3-4 times  How many times do you check your blood glucose per day:  1-2  Do you have a Continuous glucose monitor (CGM) : No (remind pt that not approved for hospital use)  Any Barriers to therapy - Be specific :  No    Prior to Admission medications    Medication Sig Last Dose Taking? Auth Provider   albuterol (PROAIR HFA/PROVENTIL HFA/VENTOLIN HFA) 108 (90 Base) MCG/ACT inhaler Inhale 2 puffs into the lungs every 4 hours as needed for shortness of breath / dyspnea or wheezing prn Yes Eh Cantor MD   ibuprofen (ADVIL/MOTRIN) 200 MG tablet Take 200 mg by mouth every 6 hours as needed (headache) prn Yes Unknown, Entered By History   insulin aspart (NOVOLOG FLEXPEN) 100 UNIT/ML pen 2 units/15 grams carbohydrate tid with meals. An average of 10-15 units with every meal 2/11/2020  at pm Yes Unknown, Entered By History   insulin aspart (NOVOLOG FLEXPEN) 100 UNIT/ML pen Correction scale: 1 unit/50mg over 150. ie 1 unit for 150-200, 2 units for 201-250 etc prn Yes Marko Brink MD   insulin glargine (BASAGLAR KWIKPEN) 100 UNIT/ML pen Inject 25 Units Subcutaneous At Bedtime 2/11/2020 at [facility], Ana Mendoza MD

## 2020-02-13 NOTE — PLAN OF CARE
PRIMARY DIAGNOSIS: HYPO/HYPERGLYCEMIA    OUTPATIENT/OBSERVATION GOALS TO BE MET BEFORE DISCHARGE  BG greater than 100 and less than 250 on two consecutive readings: No  Recent Labs   Lab Test 02/13/20  0125 02/12/20  2342 02/12/20 1955   * 341* 201*         Ketones absent from urine  (hyperglycemia): No    Tolerating oral intake to maintain hydration: yes    Return to near baseline physical activity: Yes    Discharge Planner Nurse   Safe discharge environment identified: Yes  Barriers to discharge: Yes BG and fluids monitoring       Entered by: Chrystal Wall 02/13/2020 4:42 AM     Please review provider order for any additional goals.   Nurse to notify provider when observation goals have been met and patient is ready for discharge.    Pt is SL-[place] and NS infusing at 100mL/hr in RA.  Independent. Diet: mod carb.  Zofran x1 for nausea relief.  Pt came to floor 0015.  Notify provider when BG less than 300.

## 2020-02-13 NOTE — ED NOTES
[facility]  ED Nurse Handoff Report    Bandar Cadena is a 24 year old male   ED Chief complaint: Diabetes; Nausea; and Vomiting  . ED Diagnosis:   Final diagnoses:   Type 1 diabetes mellitus with hyperglycemia (H)     Allergies: No Known Allergies    Code Status: Full Code  Activity level - Baseline/Home:  Independent. Activity Level - Current:   Independent. Lift room needed: No. Bariatric: No   Needed: No   Isolation: No. Infection: Not Applicable.     Vital Signs:   Vitals:    02/12/20 2200 02/12/20 2215 02/12/20 2230 02/12/20 2245   BP: 108/69 105/68 101/66 105/67   Pulse: 84 93 87 88   Resp:       Temp:       TempSrc:       SpO2: 100% 98% 100% 97%   Weight:       Height:           Cardiac Rhythm:  ,   Cardiac  Cardiac Rhythm: Other (Comment)(SD)  Pain level: 0-10 Pain Scale: 0  Patient confused: No. Patient Falls Risk: No.   Elimination Status: Has voided   Patient Report - Initial Complaint: Nausea, vomiting, Type I DM. Focused Assessment: Cardiac-  Tele showing SD. GI- Nausea and vomiting this afternoon. Checked BG and found it to be 588. Checked ketones in urine, which was positive. Gave self 20 units of Novolog. Hx of DKA. Upon arrival to ED, pt denies nausea and vomiting. Lab values suggesting probable DKA.   Tests Performed: EKG, Labs. Abnormal Results:   Labs Ordered and Resulted from Time of ED Arrival Up to the Time of Departure from the ED   COMPREHENSIVE METABOLIC PANEL - Abnormal; Notable for the following components:       Result Value    Glucose 207 (*)     All other components within normal limits   GLUCOSE BY METER - Abnormal; Notable for the following components:    Glucose 198 (*)     All other components within normal limits   BLOOD GAS VENOUS - Abnormal; Notable for the following components:    Ph Venous 7.23 (*)     PCO2 Venous 56 (*)     PO2 Venous 20 (*)     All other components within normal limits   HEMOGLOBIN A1C - Abnormal; Notable for the following  components:    Hemoglobin A1C 11.2 (*)     All other components within normal limits   KETONE BETA-HYDROXYBUTYRATE QUANTITATIVE - Abnormal; Notable for the following components:    Ketone Quantitative 3.5 (*)     All other components within normal limits   GLUCOSE BY METER - Abnormal; Notable for the following components:    Glucose 201 (*)     All other components within normal limits   KETONE BETA-HYDROXYBUTYRATE QUANTITATIVE - Abnormal; Notable for the following components:    Ketone Quantitative 5.4 (*)     All other components within normal limits   BASIC METABOLIC PANEL - Abnormal; Notable for the following components:    Carbon Dioxide 19 (*)     Glucose 255 (*)     Calcium 7.6 (*)     All other components within normal limits   CBC WITH PLATELETS DIFFERENTIAL   GLUCOSE MONITOR NURSING POCT   MAGNESIUM   LACTIC ACID WHOLE BLOOD   BLOOD GAS VENOUS AND OXYHGB   ALCOHOL ETHYL   CARDIAC CONTINUOUS MONITORING   PERIPHERAL IV CATHETER   IV ACCESS   NOTIFY PHYSICIAN   GLUCOSE BY METER POCT   GLUCOSE BY METER POCT   GLUCOSE BY METER POCT   GLUCOSE BY METER POCT   GLUCOSE BY METER POCT   GLUCOSE BY METER POCT   GLUCOSE BY METER POCT     .   Treatments provided: 2 L bolus NS  Family Comments: Wife Kim at bedside  OBS brochure/video discussed/provided to patient:  Yes  ED Medications:   Medications   dextrose 50 % injection 25-50 mL (has no administration in time range)   0.9% sodium chloride BOLUS (0 mLs Intravenous Stopped 2/12/20 1950)   0.9% sodium chloride BOLUS (0 mLs Intravenous Stopped 2/12/20 2208)     Drips infusing:  No  For the majority of the shift, the patient's behavior Green. Interventions performed were N/A.     Severe Sepsis OR Septic Shock Diagnosis Present: No      ED Nurse Name/Phone Number: Eloisa Tavares RN,   11:00 PM    RECEIVING UNIT ED HANDOFF REVIEW    Above ED Nurse Handoff Report was reviewed: Yes  Reviewed by: Chrystal Wall RN on February 13, 2020 at 12:02 AM

## 2020-02-13 NOTE — PLAN OF CARE
Patient's After Visit Summary was reviewed with patient: YES  Patient verbalized understanding of After Visit Summary, recommended follow up and was given an opportunity to ask questions.   Discharge medications sent home with patient/family: NA  Discharged with SELF    OBSERVATION patient END time: 0902    Vitals stable. Pt alert and oriented x4. Independently moving. Denies chest pain/nausea/vomiting/fatigue. , insulin given. Patient stable and ready for discharge.

## 2020-02-13 NOTE — H&P
[facility]    History and Physical  Hospitalist       Date of Admission:  2/12/2020    Assessment and Plan:      Bandar Cadena is a 24 year old male with history of type 1 diabetes noncompliant with his medications presents to the hospital with hyperglycemia, patient noted at home blood sugars> 500 and gave himself 20 units of insulin novolog.  In the ED labs showing elevated ketones, blood sugars in the 200s, his ABGs no evidence of acidosis.  Initial ABG did show respiratory acidosis but however last most likely inaccurate.  Has been admitted to the hospital for further management of his hyperglycemia.    Diabetes myelitis type 1 noncompliant with medications now with hyperglycemia  - Home patient takes Glargine 25 units, insulin aspart 2 units per 15 g carbs with sliding scale. I will continue lantus 25 units, novolog 2.5 units per 15 carbs with sliding scale.  - continue IV fluids  - he will need new prescription of his insulin as he no longer has anymore prescription     Elevated Ketones, monitor, no evidence of DKA, possibly dehydration realted       ---------     # Code status: Full  # Anticipated discharge date and Disposition: in 1 day  # DVT: SCDs  # IVF: Normal Saline at 100 ml/hour                  Guillermina Valerio MD  Text Page (7am - 6pm, M-F)          Primary Care Physician   SHAWN FOX    Chief Complaint   hyperglycemia    History is obtained from the patient    History of Present Illness   Bandar Cadena is a 24 year old male with history of type 1 diabetes noncompliant with his medications presents to the hospital with hyperglycemia. In the AM patient did not feel well, and had episode of nausea, and vomiting, he checked his sugars they were greater then 500 thus he gave himself insulin novolog 20 units. Afterwards, patient checked his blood sugars every hour, and also checked a urine ketone which was elevated thus he presented to the ED.  The patient is doing well he has minimal  complaints nausea vomiting has resolved.  Patient is noncompliant with his medications he says he frequently forgets to take his insulin.  He is also running out of his prescriptions as he was changed over to his significant other's insurance.     .Past Medical History    I have reviewed this patient's medical history and updated it with pertinent information if needed.   Past Medical History:   Diagnosis Date     Asthma      Diabetes mellitus type 1 (H)        Past Surgical History   I have reviewed this patient's surgical history and updated it with pertinent information if needed.  No past surgical history on file.    Prior to Admission Medications   Prior to Admission Medications   Prescriptions Last Dose Informant Patient Reported? Taking?   albuterol (PROAIR HFA/PROVENTIL HFA/VENTOLIN HFA) 108 (90 Base) MCG/ACT inhaler   No No   Sig: Inhale 2 puffs into the lungs every 4 hours as needed for shortness of breath / dyspnea or wheezing   enalapril (VASOTEC) 10 MG tablet   Yes No   Sig: Take 10 mg by mouth daily   ibuprofen (ADVIL/MOTRIN) 200 MG tablet   Yes No   Sig: Take 200 mg by mouth every 6 hours as needed (headache)   insulin aspart (NOVOLOG FLEXPEN) 100 UNIT/ML pen   No No   Sig: Correction scale: 1 unit/50mg over 150. ie 1 unit for 150-200, 2 units for 201-250 etc   insulin aspart (NOVOLOG FLEXPEN) 100 UNIT/ML pen   No No   Si.5 units/15 grams carbohydrate tid with meals.   insulin glargine (BASAGLAR KWIKPEN) 100 UNIT/ML pen   No No   Sig: Inject 25 Units Subcutaneous At Bedtime   insulin glargine (LANTUS PEN) 100 UNIT/ML pen   No No   Sig: Inject 25 Units Subcutaneous At Bedtime      Facility-Administered Medications: None     Allergies   No Known Allergies    Social History   I have reviewed this patient's social history and updated it with pertinent information if needed. Bandar Cadena  reports that he has never smoked. He has never used smokeless tobacco. He reports that he does not drink alcohol  or use drugs.    Family History   Family history reviewed with patient and is noncontributory.    ROS   point review system was discussed with patient and was negative as per HPI       Physical Exam   Temp: 97.6  F (36.4  C) Temp src: Oral BP: 105/67 Pulse: 88 Heart Rate: 89 Resp: 20 SpO2: 97 % O2 Device: None (Room air)    Vital Signs with Ranges  Temp:  [97.6  F (36.4  C)] 97.6  F (36.4  C)  Pulse:  [73-93] 88  Heart Rate:  [78-93] 89  Resp:  [20] 20  BP: ()/(56-70) 105/67  SpO2:  [97 %-100 %] 97 %  150 lbs 0 oz    General: Pt in NAD, normal appearance  HEENT: PERRLA, EOMI, normocephalic / atraumatic no cervical LAD, no bruit, no pallor, WNL oropharynx, neck supple  Cardiac: +S1, S2, RRR, no MRG, no edema  Lungs: Clear to Auscultation Bilateral, normal breathing without accessory muscle usage, no wheezing, rhonchi or crackles  GI: soft NT/ND +bowel sounds all quadrants, no hepatosplenomegaly  Psych: normal mood and affect, A&Ox3  Neurological: A&O x3, CN II-XII grossly intact, sensation intact  Skin: warm, dry, normal turgor, no rash    Data   Data reviewed today:  I personally reviewed no images or EKG's today.  Recent Labs   Lab 02/12/20  2208 02/12/20  1741   WBC  --  8.1   HGB  --  17.6   MCV  --  85   PLT  --  297    133   POTASSIUM 3.8 3.5   CHLORIDE 105 100   CO2 19* 26   BUN 18 21   CR 0.69 0.75   ANIONGAP 13 7   SPEEDY 7.6* 8.8   * 207*   ALBUMIN  --  4.2   PROTTOTAL  --  8.0   BILITOTAL  --  0.9   ALKPHOS  --  83   ALT  --  24   AST  --  17       No results found for this or any previous visit (from the past 24 hour(s)).

## 2020-02-13 NOTE — PLAN OF CARE
PRIMARY DIAGNOSIS: HYPO/HYPERGLYCEMIA    OUTPATIENT/OBSERVATION GOALS TO BE MET BEFORE DISCHARGE  BG greater than 100 and less than 250 on two consecutive readings: No  Recent Labs   Lab Test 02/13/20  0125 02/12/20  2342 02/12/20 1955   * 341* 201*         Ketones absent from urine  (hyperglycemia): No    Tolerating oral intake to maintain hydration: NS infusing at 100mL/hr    Return to near baseline physical activity: Yes    Discharge Planner Nurse   Safe discharge environment identified: Yes  Barriers to discharge: Yes       Entered by: Chrystal Wall 02/13/2020 1:36 AM     Please review provider order for any additional goals.   Nurse to notify provider when observation goals have been met and patient is ready for discharge.    Pt is A&O x4.  25 units of Lantus given and 2 units of Novolog given for BG of 330.

## 2020-02-14 ENCOUNTER — TELEPHONE (OUTPATIENT)
Dept: INTERNAL MEDICINE | Facility: CLINIC | Age: 25
End: 2020-02-14

## 2020-02-14 NOTE — TELEPHONE ENCOUNTER
IP F/U    Date: 2/13/20  Diagnosis: Type 1 Diabetes Mellitus With Hyperglycemia (H)  Is patient active in care coordination? No  Was patient in TCU? No    Next 5 appointments (look out 90 days)    Mar 02, 2020  4:20 PM CST  SHORT with Brent Cobb MD  [facility] [address]  [facility] [address]  [phone]

## 2020-02-20 DIAGNOSIS — E10.9 TYPE 1 DIABETES MELLITUS WITHOUT COMPLICATION (H): ICD-10-CM

## 2020-02-24 RX ORDER — INSULIN GLARGINE 100 [IU]/ML
25 INJECTION, SOLUTION SUBCUTANEOUS AT BEDTIME
Qty: 21 ML | Refills: 1 | OUTPATIENT
Start: 2020-02-24

## 2020-02-24 NOTE — TELEPHONE ENCOUNTER
Prescription was sent to [facility] by Dr. Francois 2/13/2020 at hospital discharge. Contacted [facility], they received prescription for Basaglar on 2/13/2020.      Prescription needed to be changed to Lantus because of insurance coverage, however, they have prescription on file for this.

## 2020-02-26 ENCOUNTER — OFFICE VISIT (OUTPATIENT)
Dept: ENDOCRINOLOGY | Facility: CLINIC | Age: 25
End: 2020-02-26
Payer: COMMERCIAL

## 2020-02-26 VITALS
BODY MASS INDEX: 20.97 KG/M2 | DIASTOLIC BLOOD PRESSURE: 72 MMHG | OXYGEN SATURATION: 100 % | WEIGHT: 158.2 LBS | HEIGHT: 73 IN | TEMPERATURE: 97.6 F | RESPIRATION RATE: 16 BRPM | SYSTOLIC BLOOD PRESSURE: 104 MMHG | HEART RATE: 96 BPM

## 2020-02-26 DIAGNOSIS — E10.9 TYPE 1 DIABETES MELLITUS WITHOUT COMPLICATION (H): ICD-10-CM

## 2020-02-26 DIAGNOSIS — R73.9 HYPERGLYCEMIA: Primary | ICD-10-CM

## 2020-02-26 PROCEDURE — 99207 C FOOT EXAM  NO CHARGE: CPT | Performed by: INTERNAL MEDICINE

## 2020-02-26 PROCEDURE — 99204 OFFICE O/P NEW MOD 45 MIN: CPT | Performed by: INTERNAL MEDICINE

## 2020-02-26 RX ORDER — INSULIN GLARGINE 100 [IU]/ML
28 INJECTION, SOLUTION SUBCUTANEOUS AT BEDTIME
Qty: 15 ML | Refills: 1 | Status: SHIPPED | OUTPATIENT
Start: 2020-02-26 | End: 2020-07-01

## 2020-02-26 RX ORDER — INSULIN LISPRO 100 [IU]/ML
INJECTION, SOLUTION INTRAVENOUS; SUBCUTANEOUS
Qty: 15 ML | Refills: 1 | Status: SHIPPED | OUTPATIENT
Start: 2020-02-26 | End: 2020-09-11

## 2020-02-26 ASSESSMENT — MIFFLIN-ST. JEOR: SCORE: 1751.47

## 2020-02-26 NOTE — LETTER
2/26/2020         RE: Bandar Cadena  [id number]  [place] [facility] [place] [address]        Dear Colleague,    Thank you for referring your patient, Bandar Cadena, to the [facility]. Please see a copy of my visit note below.    Endocrinology Clinic Note:  Name: Bandar Cadena  Seen in consultation with  for Diabetes.  HPI:  Bandar Cadena is a 24 year old male who presents for the evaluation/management of type 1 diabetes.   has a past medical history of Asthma and Diabetes mellitus type 1 (H).  History of noncompliance.  Historically poorly controlled diabetes.  Was recently admitted to hospital for hyperglycemia.  He has never been on insulin pump.  Was on Dexcom in past but was not covered.    Was seen at [facility] [place].  No records to review.     1. Type 1 DM:  Orginally diagnosed at the age of: 9.   Current Regimen:   yes:     Diabetes Medication(s)     Insulin       insulin aspart (NOVOLOG FLEXPEN) 100 UNIT/ML pen    2 units/15 grams carbohydrate tid with meals. An average of 10-15 units with every meal     insulin aspart (NOVOLOG FLEXPEN) 100 UNIT/ML pen    Correction scale: 1 unit/50mg over 150. ie 1 unit for 150-200, 2 units for 201-250 etc     insulin glargine (BASAGLAR KWIKPEN) 100 UNIT/ML pen    Inject 25 Units Subcutaneous At Bedtime          BS checks: few times a week. Not everyday.  BG is variable.  Average Meter Download: 266 with variable BG.  Exercise: not much.   Last A1c: 11.2%  Symptoms of hypoglycemia (low blood sugar):   Using PUMP:      DM Complications:   Complications:   Diabetes Complications  Description / Detail    Diabetic Retinopathy  No   CAD / PAD  No   Neuropathy  No   Nephropathy / Microalbuminuria  No  Lab Results   Component Value Date    UMALCR 38.25 02/07/2019         Gastroparesis  No   Hypoglycemia [place]  No        2. Hypertension: Blood Pressure today:   BP Readings from Last 3 Encounters:   02/26/20 104/72    02/13/20 106/55   04/19/19 120/70     3. Hyperlipidemia:  On medication    PMH/PSH:  Past Medical History:   Diagnosis Date     Asthma      Diabetes mellitus type 1 (H)      History reviewed. No pertinent surgical history.  Family Hx:  Family History   Problem Relation Age of Onset     No Known Problems Mother      No Known Problems Father            DM2: Grandpa and uncle.           Social Hx:  Social History     Socioeconomic History     Marital status:      Spouse name: Not on file     Number of children: Not on file     Years of education: Not on file     Highest education level: Not on file   Occupational History     Not on file   Social Needs     Financial resource strain: Not on file     Food insecurity:     Worry: Not on file     Inability: Not on file     Transportation needs:     Medical: Not on file     Non-medical: Not on file   Tobacco Use     Smoking status: Never Smoker     Smokeless tobacco: Never Used   Substance and Sexual Activity     Alcohol use: No     Drug use: No     Sexual activity: Yes     Partners: Female   Lifestyle     Physical activity:     Days per week: Not on file     Minutes per session: Not on file     Stress: Not on file   Relationships     Social connections:     Talks on phone: Not on file     Gets together: Not on file     Attends Je[employer] witness service: Not on file     Active member of club or organization: Not on file     Attends meetings of clubs or organizations: Not on file     Relationship status: Not on file     Intimate partner violence:     Fear of current or ex partner: Not on file     Emotionally abused: Not on file     Physically abused: Not on file     Forced sexual activity: Not on file   Other Topics Concern     Not on file   Social History Narrative     Not on file          MEDICATIONS:  has a current medication list which includes the following prescription(s): albuterol, ibuprofen, insulin aspart, insulin aspart, and insulin glargine.    ROS     ROS: 10  "point ROS neg other than the symptoms noted above in the HPI.    Physical Exam   VS: /72 (BP Location: Left arm, Patient Position: Chair, Cuff Size: Adult Regular)   Pulse 96   Temp 97.6  F (36.4  C) (Oral)   Resp 16   Ht 1.854 m (6' 1\")   Wt 71.8 kg (158 lb 3.2 oz)   SpO2 100%   BMI 20.87 kg/m     GENERAL: AXOX3, NAD, well dressed, answering questions appropriately, appears stated age.  HEENT: No exopthalmous, no proptosis, EOMI, no lig lag, no retraction  CV: RRR  LUNGS: CTAB, no wheezing  ABDOMEN: non distended, +BS  NEUROLOGY: CN grossly intact, no tremors  DM FOOT EXAM: normal DP and PT pulses, no trophic changes or ulcerative lesions, normal sensory exam and normal monofilament exam.   MSK: grossly intact, no swelling, + pulses.  SKIN: no rashes, no lesions  PSYCH: normal affect and mood      LABS:  A1c:  Lab Results   Component Value Date    A1C 11.2 02/12/2020    A1C 12.7 02/07/2019    A1C 12.5 07/20/2018         BMP:  Last Basic Metabolic Panel:  Lab Results   Component Value Date     02/13/2020      Lab Results   Component Value Date    POTASSIUM 4.5 02/13/2020     Lab Results   Component Value Date    CHLORIDE 105 02/13/2020     Lab Results   Component Value Date    SPEEDY 7.6 02/13/2020     Lab Results   Component Value Date    CO2 12 02/13/2020     Lab Results   Component Value Date    BUN 15 02/13/2020     Lab Results   Component Value Date    CR 0.79 02/13/2020     Lab Results   Component Value Date     02/13/2020         Urine Micro:  Lab Results   Component Value Date    MICROL 83 02/07/2019     No results found for: MICROALBUMIN      LFTs/Lipids:  No results for input(s): CHOL, HDL, LDL, TRIG, CHOLHDLRATIO in the last 50984 hours.    Liver Function Studies -   Recent Labs   Lab Test 02/12/20  1741   PROTTOTAL 8.0   ALBUMIN 4.2   BILITOTAL 0.9   ALKPHOS 83   AST 17   ALT 24         TFTs:  No results found for: TSH        Blood Glucose and pump data/ Meter reviewed.     All " pertinent notes, labs, and images personally reviewed by me.       Glucometer/ insulin pump (if applicable)/ CGM data (if applicable) downloaded, Personally reviewed and interpreted.  All Blood sugar data reviewed personally and interpreted as well as discussed with pt.  See nursing note from 2/26/2020 for details of BG/CGM log.  Where applicable   All past medical, social and Family history reviewed and updated in [facility].    A/P  Mr.Ethan PAT Cadena is a 24 year old here for the evaluation/management of diabetes:    1. DM1 - Under Poor control.  A1c 11.2%.  No known complications from diabetes.  Historically diabetes is under poor control  He is not checking blood sugar every day and is not taking NovoLog correctly.  He is with his spouse and reports that he is more motivated to check blood sugar and take control of diabetes.  He also reports that his insurance does not cover NovoLog and he would like a new prescription for Humalog which was sent.  He is interested to know more about her insulin pump as well as continuous glucose monitors.  Plan:  Discussed diagnosis, pathophysiology, management and treatment options of condition with pt.  I also discussed importance of strict blood sugar control to prevent complications associated with uncontrolled diabetes.  Increase Basaglar to 28 unit/day.  Switch to Humalog per his request.  Continue same dose as current dose of Novolog.  He needs to check blood sugar before each meal and take NovoLog dose correctly.  Recommend diagnostic continous glucose monitor and DM education. CDE referral in place.  Labs and follow up in 3 months  Check cost of Dexcom and freestyle amauri with insurance and let us know. Will send prescription based on that.    2. Hypertension - Under Good  control.  Not on medication    3. Hyperlipidemia - Under Good control.  Not on medication  4. Prevention  Flu Shot-recommend annually.  Ophthalmology-recommend annually.  ASA-not indicated secondary to  age.  Smoking-no.    Most Recent Immunizations   Administered Date(s) Administered     DTAP (<7y) 08/27/1998     Flu, Unspecified 12/12/1996     Hep B, Peds or Adolescent 04/07/1994     HepB, Unspecified 04/07/1994     Historical HIB 12/19/1994     Influenza (IIV3) PF 10/30/2008     Influenza Vaccine IM > 6 months Valent IIV4 10/05/2019     MMR 08/23/2000     Meningococcal (Menveo ) 09/19/2011     OPV, trivalent, live 08/27/1998     Poliovirus, inactivated (IPV) 08/27/1998     TDAP Vaccine (Boostrix) 07/30/2014     TRIHIBIT (DTAP/HIB, <7y) 08/27/1998     Td (Adult), Adsorbed 10/07/2004     Varicella 09/19/2011         Recommend checking blood sugars before meals and at bedtime.    If Blood glucose are low more often-> 2-3 times/week- give us a call.  The patient is advised to Make better food choices: reduce carbs, Reduce portion size, weight loss and exercise 3-4 times a week.  Discussed hypoglycemia signs and symptoms as well as management in detail.      There is some variability among people, most will usually develop symptoms suggestive of hypoglycemia when blood glucose levels are lowered to the mid 60's. The first set of symptoms are called adrenergic. Patients may experience any of the following nervousness, sweating, intense hunger, trembling, weakness, palpitations, and difficulty speaking. When BS fall below 50 the patient is unable to talk and take oral therapy.  Would recommend Glucagon emergency kit for the patient and education for family and friends around the patient.   The acute management of hypoglycemia involves the rapid delivery of a source of easily absorbed sugar. Regular soda, juice, lifesavers, table sugar, are good options. 15 grams of glucose is the dose that is given, followed by an assessment of symptoms and a blood glucose check if possible. If after 10 minutes there is no improvement, another 10-15 grams should be given. This can be repeated up to three times. The equivalency of 10-15  grams of glucose (approximate servings) are: Four lifesavers, 4 teaspoons of sugar, or 1/2 cup or 4 oz of juice or regular pop.    Labs ordered today: No orders of the defined types were placed in this encounter.      Radiology/Consults ordered today: C FOOT EXAM  NO CHARGE    Follow-up:  3 months    Kaya Fernandez M.D  Endocrinology  [facility]  CC: Clinic, [facility]      All questions were answered.  The patient indicates understanding of the above issues and agrees with the plan set forth.     Disclaimer: This note consists of symbols derived from keyboarding, dictation and/or voice recognition software. As a result, there may be errors in the script that have gone undetected. Please consider this when interpreting information found in this chart.    Addendum to above note and clinic visit:    Labs reviewed.    See result note/telephone encounter.            Again, thank you for allowing me to participate in the care of your patient.        Sincerely,        Kaya Fernandez MD

## 2020-02-26 NOTE — NURSING NOTE
ENDOCRINOLOGY INTAKE FORM    Patient Name:  Bandar Cadena  :  1995    Is patient Diabetic?   Yes: type 1  Does patient have non-diabetic or other endocrine issues?  No    Vitals: There were no vitals taken for this visit.  BMI= There is no height or weight on file to calculate BMI.    Flu vaccine:  Yes: 10/05/19  Pneumonia vaccine:  No    Smoking and Alcohol use:  Social History     Tobacco Use     Smoking status: Never Smoker     Smokeless tobacco: Never Used   Substance Use Topics     Alcohol use: No     Drug use: No       Foot Exam: No  Eye Exam:  Yes: 2019  Dental Exam:  No  Aspirin Use:  No    Lab Results   Component Value Date    A1C 11.2 2020    A1C 12.7 2019    A1C 12.5 2018       Lab Results   Component Value Date    MICROL 83 2019     No results found for: MICROALBUMIN    CHRISTOPHER Carrillo Endocrinology  Noreen/Carlos

## 2020-02-26 NOTE — PATIENT INSTRUCTIONS
[facility] & [place] locations   Dr Fernandez, Endocrinology Department      [facility]   [address]  [place] [address]  Appointment [phone]  Fax: [phone]  [place]: Monday and Tuesday         [facility]   [address]  [place] [address]  Appointment [phone]  Fax: [phone]  [place]: Wednesday and Thursday            Please check the cost coverage and copay with insurance before recommended tests, services and medications (especially if new medications are prescribed).     If ordered, please get blood work done 1 week prior to your next appointment so they will be available to Dr. Fernandez at your visit.    To provide the best diabetic care, please bring your blood glucose meter to each and every visit with your  Endocrinologist. Your blood glucose meter/insulin pump will be downloaded at every appointment.  Please arrive 15 minutes before your scheduled appointment. This will allow for your blood glucose meter/insulin pump  to be downloaded.  If you are wearing DEXCOM please bring  or sharing code from the Dexcom Clarity Appt so that it can be downloaded.  If you are using freestyle amauri personal sensors please bring the reader.  If you are using TANDEM insulin pump please have your username and password to get info from Tandem website.      Increase Basaglar to 28 unit/day.  Switch to Humalog.  Continue same dose as current dose of Novolog.  Your provider has referred you to Diabetes Education: For all [facility]:  Phone [phone]; Fax [phone]  Please call and make the appointment.-->continous glucose monitor (dexom, ipro) (diagnostic) and DM education.  Labs and follow up in 3 months  Check cost of Dexcom and freestyle amauri with insurance and let us know. Will send prescription based on that.    Recommend checking blood sugars before meals and at  bedtime.    If Blood glucose are low more often-> 2-3 times/week- give us a call.  The patient is advised to Make better food choices: reduce carbs, Reduce portion size, weight loss and exercise 3-4 times a week.  Discussed hypoglycemia signs and symptoms as well as management in detail.

## 2020-02-26 NOTE — PROGRESS NOTES
Endocrinology Clinic Note:  Name: Bandar Cadena  Seen in consultation with  for Diabetes.  HPI:  Bandar Cadena is a 24 year old male who presents for the evaluation/management of type 1 diabetes.   has a past medical history of Asthma and Diabetes mellitus type 1 (H).  History of noncompliance.  Historically poorly controlled diabetes.  Was recently admitted to hospital for hyperglycemia.  He has never been on insulin pump.  Was on Dexcom in past but was not covered.    Was seen at Endocrinology clinic of [place].  No records to review.     1. Type 1 DM:  Orginally diagnosed at the age of: 9.   Current Regimen:   yes:     Diabetes Medication(s)     Insulin       insulin aspart (NOVOLOG FLEXPEN) 100 UNIT/ML pen    2 units/15 grams carbohydrate tid with meals. An average of 10-15 units with every meal     insulin aspart (NOVOLOG FLEXPEN) 100 UNIT/ML pen    Correction scale: 1 unit/50mg over 150. ie 1 unit for 150-200, 2 units for 201-250 etc     insulin glargine (BASAGLAR KWIKPEN) 100 UNIT/ML pen    Inject 25 Units Subcutaneous At Bedtime          BS checks: few times a week. Not everyday.  BG is variable.  Average Meter Download: 266 with variable BG.  Exercise: not much.   Last A1c: 11.2%  Symptoms of hypoglycemia (low blood sugar):   Using PUMP:      DM Complications:   Complications:   Diabetes Complications  Description / Detail    Diabetic Retinopathy  No   CAD / PAD  No   Neuropathy  No   Nephropathy / Microalbuminuria  No  Lab Results   Component Value Date    UMALCR 38.25 02/07/2019         Gastroparesis  No   Hypoglycemia Unawarness  No        2. Hypertension: Blood Pressure today:   BP Readings from Last 3 Encounters:   02/26/20 104/72   02/13/20 106/55   04/19/19 120/70     3. Hyperlipidemia:  On medication    PMH/PSH:  Past Medical History:   Diagnosis Date     Asthma      Diabetes mellitus type 1 (H)      History reviewed. No pertinent surgical history.  Family Hx:  Family History  "  Problem Relation Age of Onset     No Known Problems Mother      No Known Problems Father            DM2: Grandpa and uncle.           Social Hx:  Social History     Socioeconomic History     Marital status:      Spouse name: Not on file     Number of children: Not on file     Years of education: Not on file     Highest education level: Not on file   Occupational History     Not on file   Social Needs     Financial resource strain: Not on file     Food insecurity:     Worry: Not on file     Inability: Not on file     Transportation needs:     Medical: Not on file     Non-medical: Not on file   Tobacco Use     Smoking status: Never Smoker     Smokeless tobacco: Never Used   Substance and Sexual Activity     Alcohol use: No     Drug use: No     Sexual activity: Yes     Partners: Female   Lifestyle     Physical activity:     Days per week: Not on file     Minutes per session: Not on file     Stress: Not on file   Relationships     Social connections:     Talks on phone: Not on file     Gets together: Not on file     Attends Latter-day service: Not on file     Active member of club or organization: Not on file     Attends meetings of clubs or organizations: Not on file     Relationship status: Not on file     Intimate partner violence:     Fear of current or ex partner: Not on file     Emotionally abused: Not on file     Physically abused: Not on file     Forced sexual activity: Not on file   Other Topics Concern     Not on file   Social History Narrative     Not on file          MEDICATIONS:  has a current medication list which includes the following prescription(s): albuterol, ibuprofen, insulin aspart, insulin aspart, and insulin glargine.    ROS     ROS: 10 point ROS neg other than the symptoms noted above in the HPI.    Physical Exam   VS: /72 (BP Location: Left arm, Patient Position: Chair, Cuff Size: Adult Regular)   Pulse 96   Temp 97.6  F (36.4  C) (Oral)   Resp 16   Ht 1.854 m (6' 1\")   Wt " 71.8 kg (158 lb 3.2 oz)   SpO2 100%   BMI 20.87 kg/m    GENERAL: AXOX3, NAD, well dressed, answering questions appropriately, appears stated age.  HEENT: No exopthalmous, no proptosis, EOMI, no lig lag, no retraction  CV: RRR  LUNGS: CTAB, no wheezing  ABDOMEN: non distended, +BS  NEUROLOGY: CN grossly intact, no tremors  DM FOOT EXAM: normal DP and PT pulses, no trophic changes or ulcerative lesions, normal sensory exam and normal monofilament exam.   MSK: grossly intact, no swelling, + pulses.  SKIN: no rashes, no lesions  PSYCH: normal affect and mood      LABS:  A1c:  Lab Results   Component Value Date    A1C 11.2 02/12/2020    A1C 12.7 02/07/2019    A1C 12.5 07/20/2018         BMP:  Last Basic Metabolic Panel:  Lab Results   Component Value Date     02/13/2020      Lab Results   Component Value Date    POTASSIUM 4.5 02/13/2020     Lab Results   Component Value Date    CHLORIDE 105 02/13/2020     Lab Results   Component Value Date    SPEEDY 7.6 02/13/2020     Lab Results   Component Value Date    CO2 12 02/13/2020     Lab Results   Component Value Date    BUN 15 02/13/2020     Lab Results   Component Value Date    CR 0.79 02/13/2020     Lab Results   Component Value Date     02/13/2020         Urine Micro:  Lab Results   Component Value Date    MICROL 83 02/07/2019     No results found for: MICROALBUMIN      LFTs/Lipids:  No results for input(s): CHOL, HDL, LDL, TRIG, CHOLHDLRATIO in the last 49028 hours.    Liver Function Studies -   Recent Labs   Lab Test 02/12/20  1741   PROTTOTAL 8.0   ALBUMIN 4.2   BILITOTAL 0.9   ALKPHOS 83   AST 17   ALT 24         TFTs:  No results found for: TSH        Blood Glucose and pump data/ Meter reviewed.     All pertinent notes, labs, and images personally reviewed by me.       Glucometer/ insulin pump (if applicable)/ CGM data (if applicable) downloaded, Personally reviewed and interpreted.  All Blood sugar data reviewed personally and interpreted as well as  discussed with pt.  See nursing note from 2/26/2020 for details of BG/CGM log.  Where applicable   All past medical, social and Family history reviewed and updated in Epic.    A/P  Mr.Ethan PAT Cadena is a 24 year old here for the evaluation/management of diabetes:    1. DM1 - Under Poor control.  A1c 11.2%.  No known complications from diabetes.  Historically diabetes is under poor control  He is not checking blood sugar every day and is not taking NovoLog correctly.  He is with his spouse and reports that he is more motivated to check blood sugar and take control of diabetes.  He also reports that his insurance does not cover NovoLog and he would like a new prescription for Humalog which was sent.  He is interested to know more about her insulin pump as well as continuous glucose monitors.  Plan:  Discussed diagnosis, pathophysiology, management and treatment options of condition with pt.  I also discussed importance of strict blood sugar control to prevent complications associated with uncontrolled diabetes.  Increase Basaglar to 28 unit/day.  Switch to Humalog per his request.  Continue same dose as current dose of Novolog.  He needs to check blood sugar before each meal and take NovoLog dose correctly.  Recommend diagnostic continous glucose monitor and DM education. CDE referral in place.  Labs and follow up in 3 months  Check cost of Dexcom and freestyle amauri with insurance and let us know. Will send prescription based on that.    2. Hypertension - Under Good  control.  Not on medication    3. Hyperlipidemia - Under Good control.  Not on medication  4. Prevention  Flu Shot-recommend annually.  Ophthalmology-recommend annually.  ASA-not indicated secondary to age.  Smoking-no.    Most Recent Immunizations   Administered Date(s) Administered     DTAP (<7y) 08/27/1998     Flu, Unspecified 12/12/1996     Hep B, Peds or Adolescent 04/07/1994     HepB, Unspecified 04/07/1994     Historical HIB 12/19/1994      Influenza (IIV3) PF 10/30/2008     Influenza Vaccine IM > 6 months Valent IIV4 10/05/2019     MMR 08/23/2000     Meningococcal (Menveo ) 09/19/2011     OPV, trivalent, live 08/27/1998     Poliovirus, inactivated (IPV) 08/27/1998     TDAP Vaccine (Boostrix) 07/30/2014     TRIHIBIT (DTAP/HIB, <7y) 08/27/1998     Td (Adult), Adsorbed 10/07/2004     Varicella 09/19/2011         Recommend checking blood sugars before meals and at bedtime.    If Blood glucose are low more often-> 2-3 times/week- give us a call.  The patient is advised to Make better food choices: reduce carbs, Reduce portion size, weight loss and exercise 3-4 times a week.  Discussed hypoglycemia signs and symptoms as well as management in detail.      There is some variability among people, most will usually develop symptoms suggestive of hypoglycemia when blood glucose levels are lowered to the mid 60's. The first set of symptoms are called adrenergic. Patients may experience any of the following nervousness, sweating, intense hunger, trembling, weakness, palpitations, and difficulty speaking. When BS fall below 50 the patient is unable to talk and take oral therapy.  Would recommend Glucagon emergency kit for the patient and education for family and friends around the patient.   The acute management of hypoglycemia involves the rapid delivery of a source of easily absorbed sugar. Regular soda, juice, lifesavers, table sugar, are good options. 15 grams of glucose is the dose that is given, followed by an assessment of symptoms and a blood glucose check if possible. If after 10 minutes there is no improvement, another 10-15 grams should be given. This can be repeated up to three times. The equivalency of 10-15 grams of glucose (approximate servings) are: Four lifesavers, 4 teaspoons of sugar, or 1/2 cup or 4 oz of juice or regular pop.    Labs ordered today: No orders of the defined types were placed in this encounter.      Radiology/Consults ordered  today: C FOOT EXAM  NO CHARGE    Follow-up:  3 months    Kaya Fernandez M.D  Endocrinology  [facility]  CC: Clinic, [facility]      All questions were answered.  The patient indicates understanding of the above issues and agrees with the plan set forth.     Disclaimer: This note consists of symbols derived from keyboarding, dictation and/or voice recognition software. As a result, there may be errors in the script that have gone undetected. Please consider this when interpreting information found in this chart.    Addendum to above note and clinic visit:    Labs reviewed.    See result note/telephone encounter.

## 2020-02-28 ENCOUNTER — TELEPHONE (OUTPATIENT)
Dept: ENDOCRINOLOGY | Facility: CLINIC | Age: 25
End: 2020-02-28

## 2020-02-28 NOTE — TELEPHONE ENCOUNTER
Diabetes Education Scheduling Outreach #1:    Call to patient to schedule. Left message with phone number to call to schedule.    Plan for 2nd outreach attempt within 2 business days.    [facility]  Diabetes and Nutrition Scheduling

## 2020-03-03 NOTE — TELEPHONE ENCOUNTER
E-mail sent to patient:  Dr. Rebecca Arnett recommended you meet with diabetes education to place a continuous glucose monitor to help adjust your insulin.  This is an extremely helpful tool, especially to know what happens at night.  Please give us a call [phone].    Hope to hear from you soon!      Shantal Reid, MS, RD, LD, CDE  Diabetes

## 2020-03-03 NOTE — TELEPHONE ENCOUNTER
Diabetes Education Scheduling Outreach #2:    Call to patient to schedule. Left message with phone number to call to schedule.    Laura Kee  [facility]  Diabetes and Nutrition Scheduling

## 2020-06-18 ENCOUNTER — MYC MEDICAL ADVICE (OUTPATIENT)
Dept: ENDOCRINOLOGY | Facility: CLINIC | Age: 25
End: 2020-06-18

## 2020-06-25 NOTE — TELEPHONE ENCOUNTER
See my chart message.  Does he need to fast for the labs?    Elmira Silva, [facility]  [place]/Carlos

## 2020-06-25 NOTE — TELEPHONE ENCOUNTER
Message sent via [employer].      Elmira Silva CMA  [facility]  [place]/[facility]os

## 2020-06-28 ENCOUNTER — MYC MEDICAL ADVICE (OUTPATIENT)
Dept: ENDOCRINOLOGY | Facility: CLINIC | Age: 25
End: 2020-06-28

## 2020-06-30 ENCOUNTER — MYC MEDICAL ADVICE (OUTPATIENT)
Dept: ENDOCRINOLOGY | Facility: CLINIC | Age: 25
End: 2020-06-30

## 2020-06-30 DIAGNOSIS — E10.9 TYPE 1 DIABETES MELLITUS WITHOUT COMPLICATION (H): ICD-10-CM

## 2020-06-30 LAB — HBA1C MFR BLD: 12.3 % (ref 0–5.6)

## 2020-06-30 PROCEDURE — 83036 HEMOGLOBIN GLYCOSYLATED A1C: CPT | Performed by: INTERNAL MEDICINE

## 2020-06-30 PROCEDURE — 36415 COLL VENOUS BLD VENIPUNCTURE: CPT | Performed by: INTERNAL MEDICINE

## 2020-06-30 PROCEDURE — 82043 UR ALBUMIN QUANTITATIVE: CPT | Performed by: INTERNAL MEDICINE

## 2020-06-30 PROCEDURE — 84443 ASSAY THYROID STIM HORMONE: CPT | Performed by: INTERNAL MEDICINE

## 2020-06-30 PROCEDURE — 80061 LIPID PANEL: CPT | Performed by: INTERNAL MEDICINE

## 2020-07-01 ENCOUNTER — VIRTUAL VISIT (OUTPATIENT)
Dept: ENDOCRINOLOGY | Facility: CLINIC | Age: 25
End: 2020-07-01
Payer: COMMERCIAL

## 2020-07-01 DIAGNOSIS — E10.9 TYPE 1 DIABETES MELLITUS WITHOUT COMPLICATION (H): ICD-10-CM

## 2020-07-01 LAB
CHOLEST SERPL-MCNC: 191 MG/DL
CREAT UR-MCNC: 75 MG/DL
HDLC SERPL-MCNC: 43 MG/DL
LDLC SERPL CALC-MCNC: 131 MG/DL
MICROALBUMIN UR-MCNC: 5 MG/L
MICROALBUMIN/CREAT UR: 6.9 MG/G CR (ref 0–17)
NONHDLC SERPL-MCNC: 148 MG/DL
TRIGL SERPL-MCNC: 86 MG/DL
TSH SERPL DL<=0.005 MIU/L-ACNC: 2.28 MU/L (ref 0.4–4)

## 2020-07-01 PROCEDURE — 99214 OFFICE O/P EST MOD 30 MIN: CPT | Mod: GT | Performed by: INTERNAL MEDICINE

## 2020-07-01 RX ORDER — PRAVASTATIN SODIUM 40 MG
40 TABLET ORAL DAILY
Qty: 90 TABLET | Refills: 0 | Status: SHIPPED | OUTPATIENT
Start: 2020-07-01

## 2020-07-01 RX ORDER — INSULIN GLARGINE 100 [IU]/ML
30 INJECTION, SOLUTION SUBCUTANEOUS AT BEDTIME
Qty: 15 ML | Refills: 1 | Status: SHIPPED | OUTPATIENT
Start: 2020-07-01 | End: 2020-11-03

## 2020-07-01 NOTE — LETTER
"    7/1/2020         RE: Bandar Cadena  [id number]  [place] [address]        Dear Colleague,    Thank you for referring your patient, Bandar Cadena, to the [facility]. Please see a copy of my visit note below.    THIS IS A VIDEO VISIT:    Phone call visit/virtual visit encounter:    Name of patient: Bandar Cadena    Date of encounter: 7/1/2020    Time of start of video visit: 4:02    Video started: 4:05    Video ended: 4;26    Time visit video ended: 4;36    Provider location: working from home/ [facility]    Patient location: patients home.    Mode of transmission: video/ Doximity    Verbal consent: obtained before starting visit. Pt is agreeable.      The patient has been notified of following:      \"This VIDEO visit will be conducted via a call between you and your physician/provider. We have found that certain health care needs can be provided without the need for a physical exam.  This service lets us provide the care you need with a short phone conversation.  If a prescription is necessary we can send it directly to your pharmacy.  If lab work is needed we can place an order for that and you can then stop by our lab to have the test done at a later time.     With new updates with corona virus patient might be billed as clinic visit.     If during the course of the call the physician/provider feels a telephone visit is not appropriate, you will not be charged for this service.\"      Past medical history, social history, family history, allergy and medications were reviewed and updated as appropriate.  Reviewed pertinent labs, notes, imaging studies personally.    Endocrinology Clinic Note:  Name: Bandar Cadena  Seen for f/u of type 1 Diabetes.  HPI:  Bandar Cadena is a 24 year old male who presents for the evaluation/management of type 1 diabetes.   has a past medical history of Asthma and Diabetes mellitus type 1 (H).  History of noncompliance.  Historically " poorly controlled diabetes.   Missing insulin doses many times.  Sometimes low BG in the setting of miscalculation.    He has never been on insulin pump.  Was on Dexcom in past but reports that it is not covered.  In last clinic visit he was referred to CDE but did not follow up.    Was seen at Endocrinology clinic of [place].  No records to review.     1. Type 1 DM:  Orginally diagnosed at the age of: 9.   Current Regimen:   Novolog about 30 Units/day  Diabetes Medication(s)     Insulin       insulin glargine (BASAGLAR KWIKPEN) 100 UNIT/ML pen    Inject 28 Units Subcutaneous At Bedtime     insulin lispro (HUMALOG KWIKPEN) 100 UNIT/ML (1 unit dial) KWIKPEN    2 units/15 grams carbohydrate tid with meals plus correctional scale 1 unit/50mg over 150. ie 1 unit for 150-200, 2 units for 201-250 etc. About 45 units/day          BS checks: few times a week. Not everyday.  BG is variable.  Average Meter Download: reviewed. See attachments on [employer].  Exercise: not much.   Last A1c: 12.3%  Symptoms of hypoglycemia (low blood sugar):   Using PUMP:      DM Complications:   Complications:   Diabetes Complications  Description / Detail    Diabetic Retinopathy  No   CAD / PAD  No   Neuropathy  No   Nephropathy / Microalbuminuria  No  Lab Results   Component Value Date    UMALCR 38.25 02/07/2019         Gastroparesis  No   Hypoglycemia [place]arness  No        2. Hypertension: Blood Pressure today:   BP Readings from Last 3 Encounters:   02/26/20 104/72   02/13/20 106/55   04/19/19 120/70     3. Hyperlipidemia:  On medication    PMH/PSH:  Past Medical History:   Diagnosis Date     Asthma      Diabetes mellitus type 1 (H)      History reviewed. No pertinent surgical history.  Family Hx:  Family History   Problem Relation Age of Onset     No Known Problems Mother      No Known Problems Father            DM2: Grandpa and uncle.           Social Hx:  Social History     Socioeconomic History     Marital status:      Spouse  name: Not on file     Number of children: Not on file     Years of education: Not on file     Highest education level: Not on file   Occupational History     Not on file   Social Needs     Financial resource strain: Not on file     Food insecurity     Worry: Not on file     Inability: Not on file     Transportation needs     Medical: Not on file     Non-medical: Not on file   Tobacco Use     Smoking status: Never Smoker     Smokeless tobacco: Never Used   Substance and Sexual Activity     Alcohol use: No     Drug use: No     Sexual activity: Yes     Partners: Female   Lifestyle     Physical activity     Days per week: Not on file     Minutes per session: Not on file     Stress: Not on file   Relationships     Social connections     Talks on phone: Not on file     Gets together: Not on file     Attends Hindu service: Not on file     Active member of club or organization: Not on file     Attends meetings of clubs or organizations: Not on file     Relationship status: Not on file     Intimate partner violence     Fear of current or ex partner: Not on file     Emotionally abused: Not on file     Physically abused: Not on file     Forced sexual activity: Not on file   Other Topics Concern     Not on file   Social History Narrative     Not on file          MEDICATIONS:  has a current medication list which includes the following prescription(s): albuterol, ibuprofen, insulin glargine, insulin lispro, aspirin not prescribed, and statin not prescribed.    ROS     ROS: 10 point ROS neg other than the symptoms noted above in the HPI.    Physical Exam   VS: There were no vitals taken for this visit.  GENERAL: healthy, alert and no distress  EYES: Eyes grossly normal to inspection, conjunctivae and sclerae normal  RESP: no audible wheeze, cough, or visible cyanosis.  No visible retractions or increased work of breathing.  Able to speak fully in complete sentences.  NEURO: Cranial nerves grossly intact, mentation intact and  speech normal  PSYCH: mentation appears normal, affect normal/bright, judgement and insight intact, normal speech and appearance well-groomed        LABS:  A1c:  Lab Results   Component Value Date    A1C 12.3 06/30/2020    A1C 11.2 02/12/2020    A1C 12.7 02/07/2019    A1C 12.5 07/20/2018         BMP:  Creatinine   Date Value Ref Range Status   02/13/2020 0.79 0.66 - 1.25 mg/dL Final       Urine Micro:  Lab Results   Component Value Date    UMALCR 6.90 06/30/2020        LFTs/Lipids:  Recent Labs   Lab Test 06/30/20  1149   CHOL 191   HDL 43   *   TRIG 86       TFTs:  TSH   Date Value Ref Range Status   06/30/2020 2.28 0.40 - 4.00 mU/L Final       Blood Glucose and pump data/ Meter reviewed.     All pertinent notes, labs, and images personally reviewed by me.       Glucometer/ insulin pump (if applicable)/ CGM data (if applicable) downloaded, Personally reviewed and interpreted.  All Blood sugar data reviewed personally and interpreted as well as discussed with pt.  See nursing note from 2/26/2020 for details of BG/CGM log.  Where applicable   All past medical, social and Family history reviewed and updated in [employer].    A/P  Mr.Ethan PAT Cadena is a 24 year old here for the evaluation/management of diabetes:    1. DM1 - Under Poor control.  A1c 11.2%.  No known complications from diabetes.  Historically diabetes is under poor control.  Missing Humalog multiple times.  He is interested to know more about her insulin pump as well as continuous glucose monitors.  Plan:  Discussed diagnosis, pathophysiology, management and treatment options of condition with pt.  I also discussed importance of strict blood sugar control to prevent complications associated with uncontrolled diabetes.  Continue Humalog at current dose. Be consistent with Humalog.  Recommend diagnostic continous glucose monitor and DM education/ pre pump education. CDE referral in place.  Check cost of Dexcom and freestyle amauri with insurance and let us  know. Will send prescription based on that. Also check cost of different insulin pumps.    2. Hypertension - Under Good  control.  Not on medication.    3. Hyperlipidemia - Under poor control.  Not on medication. LDL > 130.  Start Pravastatin 40 mg /day (7/1/2020).  Fasting labs in 3-6 months.  Discussed indications, risks and benefits of all medications prescribed, and answered questions to patient's satisfaction.  Discussed possible s/e associated like myalgias.  4. Prevention  Flu Shot-recommend annually.  Ophthalmology-recommend annually.  ASA-not indicated secondary to age.  Smoking-no.    Most Recent Immunizations   Administered Date(s) Administered     DTAP (<7y) 08/27/1998     Flu, Unspecified 12/12/1996     Hep B, Peds or Adolescent 04/07/1994     HepB, Unspecified 04/07/1994     Historical HIB 12/19/1994     Influenza (IIV3) PF 10/30/2008     Influenza Vaccine IM > 6 months Valent IIV4 10/05/2019     MMR 08/23/2000     Meningococcal (Menveo ) 09/19/2011     OPV, trivalent, live 08/27/1998     Poliovirus, inactivated (IPV) 08/27/1998     TDAP Vaccine (Boostrix) 07/30/2014     TRIHIBIT (DTAP/HIB, <7y) 08/27/1998     Td (Adult), Adsorbed 10/07/2004     Varicella 09/19/2011         Recommend checking blood sugars before meals and at bedtime.    If Blood glucose are low more often-> 2-3 times/week- give us a call.  The patient is advised to Make better food choices: reduce carbs, Reduce portion size, weight loss and exercise 3-4 times a week.  Discussed hypoglycemia signs and symptoms as well as management in detail.      There is some variability among people, most will usually develop symptoms suggestive of hypoglycemia when blood glucose levels are lowered to the mid 60's. The first set of symptoms are called adrenergic. Patients may experience any of the following nervousness, sweating, intense hunger, trembling, weakness, palpitations, and difficulty speaking. When BS fall below 50 the patient is unable to  talk and take oral therapy.  Would recommend Glucagon emergency kit for the patient and education for family and friends around the patient.   The acute management of hypoglycemia involves the rapid delivery of a source of easily absorbed sugar. Regular soda, juice, lifesavers, table sugar, are good options. 15 grams of glucose is the dose that is given, followed by an assessment of symptoms and a blood glucose check if possible. If after 10 minutes there is no improvement, another 10-15 grams should be given. This can be repeated up to three times. The equivalency of 10-15 grams of glucose (approximate servings) are: Four lifesavers, 4 teaspoons of sugar, or 1/2 cup or 4 oz of juice or regular pop.    Follow-up:  6 weeks.    Kaya Fernandez M.D  Endocrinology  [facility]  CC: Clinic, [facility]      All questions were answered.  The patient indicates understanding of the above issues and agrees with the plan set forth.     Disclaimer: This note consists of symbols derived from keyboarding, dictation and/or voice recognition software. As a result, there may be errors in the script that have gone undetected. Please consider this when interpreting information found in this chart.    Addendum to above note and clinic visit:    Labs reviewed.    See result note/telephone encounter.            Again, thank you for allowing me to participate in the care of your patient.        Sincerely,        Kaya Fernandez MD

## 2020-07-01 NOTE — PATIENT INSTRUCTIONS
[facility] [place] [facility]   Dr Fernandez, Endocrinology Department      [facility]   [address] [address]  Appointment [phone]  Fax: [phone]   Monday and Tuesday         [facility]   [address] [place] [address]  Appointment [phone]  Fax: [phone]  Wednesday and Thursday           To provide the best diabetic care, please bring your blood glucose meter to each and every visit with your Endocrinologist.  Your blood glucose meter/insulin pump will be downloaded at every appointment.    Please arrive 15 minutes before your scheduled appointment.  This will allow for your blood glucose meter/insulin pump to be downloaded.  If you are wearing DEXCOM please bring  or sharing code so that it can be downloaded.  If you are using freestyle amauri personal sensors please bring the reader.  If you are using TANDEM insulin pump please have your username and password to get info from [employer] website.    Increase Basaglar to 30 Units/day  Continue Humalog at current dose. Be consistent with Humalog.  Start Pravastatin 40 mg /day ( cholesterol medication)  Fasting labs in 3-6 months.  Your provider has referred you to Diabetes Education: For all [facility]  Phone [phone]; Fax [phone]  Please call and make the appointment- for pre pump education and diagnostic CGM.  Check cost of different pumps and Dexcom with insurance.  Follow up in 6 weeks.

## 2020-07-01 NOTE — PROGRESS NOTES
"THIS IS A VIDEO VISIT:    Phone call visit/virtual visit encounter:    Name of patient: Bandar Cadena    Date of encounter: 7/1/2020    Time of start of video visit: 4:02    Video started: 4:05    Video ended: 4;26    Time visit video ended: 4;36    Provider location: working from [place]    Patient location: patients home.    Mode of transmission: video/ Doximity    Verbal consent: obtained before starting visit. Pt is agreeable.      The patient has been notified of following:      \"This VIDEO visit will be conducted via a call between you and your physician/provider. We have found that certain health care needs can be provided without the need for a physical exam.  This service lets us provide the care you need with a short phone conversation.  If a prescription is necessary we can send it directly to your pharmacy.  If lab work is needed we can place an order for that and you can then stop by our lab to have the test done at a later time.     With new updates with corona virus patient might be billed as clinic visit.     If during the course of the call the physician/provider feels a telephone visit is not appropriate, you will not be charged for this service.\"      Past medical history, social history, family history, allergy and medications were reviewed and updated as appropriate.  Reviewed pertinent labs, notes, imaging studies personally.    Endocrinology Clinic Note:  Name: Bandar Cadena  Seen for f/u of type 1 Diabetes.  HPI:  Bandar Cadena is a 24 year old male who presents for the evaluation/management of type 1 diabetes.   has a past medical history of Asthma and Diabetes mellitus type 1 (H).  History of noncompliance.  Historically poorly controlled diabetes.   Missing insulin doses many times.  Sometimes low BG in the setting of miscalculation.    He has never been on insulin pump.  Was on Dexcom in past but reports that it is not covered.  In last clinic visit he was referred to CDE " but did not follow up.    Was seen at [facility] [place].  No records to review.     1. Type 1 DM:  Orginally diagnosed at the age of: 9.   Current Regimen:   Novolog about 30 Units/day  Diabetes Medication(s)     Insulin       insulin glargine (BASAGLAR KWIKPEN) 100 UNIT/ML pen    Inject 28 Units Subcutaneous At Bedtime     insulin lispro (HUMALOG KWIKPEN) 100 UNIT/ML (1 unit dial) KWIKPEN    2 units/15 grams carbohydrate tid with meals plus correctional scale 1 unit/50mg over 150. ie 1 unit for 150-200, 2 units for 201-250 etc. About 45 units/day          BS checks: few times a week. Not everyday.  BG is variable.  Average Meter Download: reviewed. See attachments on Proberry[employer].  Exercise: not much.   Last A1c: 12.3%  Symptoms of hypoglycemia (low blood sugar):   Using PUMP:      DM Complications:   Complications:   Diabetes Complications  Description / Detail    Diabetic Retinopathy  No   CAD / PAD  No   Neuropathy  No   Nephropathy / Microalbuminuria  No  Lab Results   Component Value Date    UMALCR 38.25 02/07/2019         Gastroparesis  No   Hypoglycemia [place]  No        2. Hypertension: Blood Pressure today:   BP Readings from Last 3 Encounters:   02/26/20 104/72   02/13/20 106/55   04/19/19 120/70     3. Hyperlipidemia:  On medication    PMH/PSH:  Past Medical History:   Diagnosis Date     Asthma      Diabetes mellitus type 1 (H)      History reviewed. No pertinent surgical history.  Family Hx:  Family History   Problem Relation Age of Onset     No Known Problems Mother      No Known Problems Father            DM2: Grandpa and uncle.           Social Hx:  Social History     Socioeconomic History     Marital status:      Spouse name: Not on file     Number of children: Not on file     Years of education: Not on file     Highest education level: Not on file   Occupational History     Not on file   Social Needs     Financial resource strain: Not on file     Food insecurity     Worry:  Not on file     Inability: Not on file     Transportation needs     Medical: Not on file     Non-medical: Not on file   Tobacco Use     Smoking status: Never Smoker     Smokeless tobacco: Never Used   Substance and Sexual Activity     Alcohol use: No     Drug use: No     Sexual activity: Yes     Partners: Female   Lifestyle     Physical activity     Days per week: Not on file     Minutes per session: Not on file     Stress: Not on file   Relationships     Social connections     Talks on phone: Not on file     Gets together: Not on file     Attends Jewish service: Not on file     Active member of club or organization: Not on file     Attends meetings of clubs or organizations: Not on file     Relationship status: Not on file     Intimate partner violence     Fear of current or ex partner: Not on file     Emotionally abused: Not on file     Physically abused: Not on file     Forced sexual activity: Not on file   Other Topics Concern     Not on file   Social History Narrative     Not on file          MEDICATIONS:  has a current medication list which includes the following prescription(s): albuterol, ibuprofen, insulin glargine, insulin lispro, aspirin not prescribed, and statin not prescribed.    ROS     ROS: 10 point ROS neg other than the symptoms noted above in the HPI.    Physical Exam   VS: There were no vitals taken for this visit.  GENERAL: healthy, alert and no distress  EYES: Eyes grossly normal to inspection, conjunctivae and sclerae normal  RESP: no audible wheeze, cough, or visible cyanosis.  No visible retractions or increased work of breathing.  Able to speak fully in complete sentences.  NEURO: Cranial nerves grossly intact, mentation intact and speech normal  PSYCH: mentation appears normal, affect normal/bright, judgement and insight intact, normal speech and appearance well-groomed        LABS:  A1c:  Lab Results   Component Value Date    A1C 12.3 06/30/2020    A1C 11.2 02/12/2020    A1C 12.7  02/07/2019    A1C 12.5 07/20/2018         BMP:  Creatinine   Date Value Ref Range Status   02/13/2020 0.79 0.66 - 1.25 mg/dL Final       Urine Micro:  Lab Results   Component Value Date    UMALCR 6.90 06/30/2020        LFTs/Lipids:  Recent Labs   Lab Test 06/30/20  1149   CHOL 191   HDL 43   *   TRIG 86       TFTs:  TSH   Date Value Ref Range Status   06/30/2020 2.28 0.40 - 4.00 mU/L Final       Blood Glucose and pump data/ Meter reviewed.     All pertinent notes, labs, and images personally reviewed by me.       Glucometer/ insulin pump (if applicable)/ CGM data (if applicable) downloaded, Personally reviewed and interpreted.  All Blood sugar data reviewed personally and interpreted as well as discussed with pt.  See nursing note from 2/26/2020 for details of BG/CGM log.  Where applicable   All past medical, social and Family history reviewed and updated in [facility].    A/P  Mr.Ethan PAT Cadena is a 24 year old here for the evaluation/management of diabetes:    1. DM1 - Under Poor control.  A1c 11.2%.  No known complications from diabetes.  Historically diabetes is under poor control.  Missing Humalog multiple times.  He is interested to know more about her insulin pump as well as continuous glucose monitors.  Plan:  Discussed diagnosis, pathophysiology, management and treatment options of condition with pt.  I also discussed importance of strict blood sugar control to prevent complications associated with uncontrolled diabetes.  Continue Humalog at current dose. Be consistent with Humalog.  Recommend diagnostic continous glucose monitor and DM education/ pre pump education. CDE referral in place.  Check cost of Dexcom and freestyle amauri with insurance and let us know. Will send prescription based on that. Also check cost of different insulin pumps.    2. Hypertension - Under Good  control.  Not on medication.    3. Hyperlipidemia - Under poor control.  Not on medication. LDL > 130.  Start Pravastatin 40 mg /day  (7/1/2020).  Fasting labs in 3-6 months.  Discussed indications, risks and benefits of all medications prescribed, and answered questions to patient's satisfaction.  Discussed possible s/e associated like myalgias.  4. Prevention  Flu Shot-recommend annually.  Ophthalmology-recommend annually.  ASA-not indicated secondary to age.  Smoking-no.    Most Recent Immunizations   Administered Date(s) Administered     DTAP (<7y) 08/27/1998     Flu, Unspecified 12/12/1996     Hep B, Peds or Adolescent 04/07/1994     HepB, Unspecified 04/07/1994     Historical HIB 12/19/1994     Influenza (IIV3) PF 10/30/2008     Influenza Vaccine IM > 6 months Valent IIV4 10/05/2019     MMR 08/23/2000     Meningococcal (Menveo ) 09/19/2011     OPV, trivalent, live 08/27/1998     Poliovirus, inactivated (IPV) 08/27/1998     TDAP Vaccine (Boostrix) 07/30/2014     TRIHIBIT (DTAP/HIB, <7y) 08/27/1998     Td (Adult), Adsorbed 10/07/2004     Varicella 09/19/2011         Recommend checking blood sugars before meals and at bedtime.    If Blood glucose are low more often-> 2-3 times/week- give us a call.  The patient is advised to Make better food choices: reduce carbs, Reduce portion size, weight loss and exercise 3-4 times a week.  Discussed hypoglycemia signs and symptoms as well as management in detail.      There is some variability among people, most will usually develop symptoms suggestive of hypoglycemia when blood glucose levels are lowered to the mid 60's. The first set of symptoms are called adrenergic. Patients may experience any of the following nervousness, sweating, intense hunger, trembling, weakness, palpitations, and difficulty speaking. When BS fall below 50 the patient is unable to talk and take oral therapy.  Would recommend Glucagon emergency kit for the patient and education for family and friends around the patient.   The acute management of hypoglycemia involves the rapid delivery of a source of easily absorbed sugar. Regular  soda, juice, lifesavers, table sugar, are good options. 15 grams of glucose is the dose that is given, followed by an assessment of symptoms and a blood glucose check if possible. If after 10 minutes there is no improvement, another 10-15 grams should be given. This can be repeated up to three times. The equivalency of 10-15 grams of glucose (approximate servings) are: Four lifesavers, 4 teaspoons of sugar, or 1/2 cup or 4 oz of juice or regular pop.    Follow-up:  6 weeks.    Kaya Fernandez M.D  Endocrinology  [facility]  CC: Clinic, [facility]      All questions were answered.  The patient indicates understanding of the above issues and agrees with the plan set forth.     Disclaimer: This note consists of symbols derived from keyboarding, dictation and/or voice recognition software. As a result, there may be errors in the script that have gone undetected. Please consider this when interpreting information found in this chart.    Addendum to above note and clinic visit:    Labs reviewed.    See result note/telephone encounter.

## 2020-08-02 ENCOUNTER — MYC MEDICAL ADVICE (OUTPATIENT)
Dept: ENDOCRINOLOGY | Facility: CLINIC | Age: 25
End: 2020-08-02

## 2020-08-06 NOTE — TELEPHONE ENCOUNTER
Form was faxed and sent to scanning.      Message sent via [employer].      Elmira Silva CMA  [facility]  Noreen/Carlos

## 2020-08-06 NOTE — TELEPHONE ENCOUNTER
Only 6 months will be one.  He need to follow up as discussed.  High A1c.  Need better DM control.    Lab Results   Component Value Date    A1C 12.3 06/30/2020    A1C 11.2 02/12/2020    A1C 12.7 02/07/2019    A1C 12.5 07/20/2018

## 2020-08-26 ENCOUNTER — MYC MEDICAL ADVICE (OUTPATIENT)
Dept: ENDOCRINOLOGY | Facility: CLINIC | Age: 25
End: 2020-08-26

## 2020-09-11 ENCOUNTER — MYC MEDICAL ADVICE (OUTPATIENT)
Dept: ENDOCRINOLOGY | Facility: CLINIC | Age: 25
End: 2020-09-11

## 2020-10-26 ENCOUNTER — MYC MEDICAL ADVICE (OUTPATIENT)
Dept: ENDOCRINOLOGY | Facility: CLINIC | Age: 25
End: 2020-10-26

## 2020-10-26 DIAGNOSIS — E10.9 TYPE 1 DIABETES MELLITUS WITHOUT COMPLICATION (H): ICD-10-CM

## 2020-10-26 LAB — HBA1C MFR BLD: 11.3 % (ref 0–5.6)

## 2020-10-26 PROCEDURE — 83036 HEMOGLOBIN GLYCOSYLATED A1C: CPT | Performed by: INTERNAL MEDICINE

## 2020-10-26 PROCEDURE — 80061 LIPID PANEL: CPT | Performed by: INTERNAL MEDICINE

## 2020-10-27 ENCOUNTER — VIRTUAL VISIT (OUTPATIENT)
Dept: ENDOCRINOLOGY | Facility: CLINIC | Age: 25
End: 2020-10-27
Payer: COMMERCIAL

## 2020-10-27 DIAGNOSIS — E10.9 TYPE 1 DIABETES MELLITUS WITHOUT COMPLICATION (H): Primary | ICD-10-CM

## 2020-10-27 DIAGNOSIS — R73.9 HYPERGLYCEMIA: ICD-10-CM

## 2020-10-27 LAB
CHOLEST SERPL-MCNC: 180 MG/DL
HDLC SERPL-MCNC: 41 MG/DL
LDLC SERPL CALC-MCNC: 121 MG/DL
NONHDLC SERPL-MCNC: 139 MG/DL
TRIGL SERPL-MCNC: 89 MG/DL

## 2020-10-27 PROCEDURE — 99213 OFFICE O/P EST LOW 20 MIN: CPT | Mod: GT | Performed by: INTERNAL MEDICINE

## 2020-10-27 RX ORDER — FLASH GLUCOSE SCANNING READER
1 EACH MISCELLANEOUS DAILY
Qty: 1 DEVICE | Refills: 1 | Status: SHIPPED | OUTPATIENT
Start: 2020-10-27 | End: 2022-01-06

## 2020-10-27 RX ORDER — FLASH GLUCOSE SENSOR
1 KIT MISCELLANEOUS DAILY
Qty: 2 EACH | Refills: 11 | Status: SHIPPED | OUTPATIENT
Start: 2020-10-27 | End: 2022-06-30

## 2020-10-27 NOTE — PATIENT INSTRUCTIONS
[facility]   Dr Fernandez, Endocrinology Department      [facility]   [address]  [place] [address]  Appointment [phone]  Fax: [phone]  [place]: Monday and Tuesday         [facility]   [address]  [place] [address]  Appointment [phone]  Fax: [phone]  [place]: Wednesday and Thursday            Please check the cost coverage and copay with insurance before recommended tests, services and medications (especially if new medications are prescribed).     If ordered, please get blood work done 1 week prior to your next appointment so they will be available to Dr. Fernandez at your visit.    Increase Basaglar to 30 units/day  Continue NovoLog at current dose.  Follow-up with diabetic educator in 6 weeks  Freestyle amauri prescription sent.  Check cost with insurance  If it is not covered I highly recommend diagnostic continuous glucose monitor.  That will be placed by diabetic educator.  We do have diabetic educators in few locations who are seeing patients in clinic.  Please coordinate accordingly  Consider insulin pump once blood sugars are under better control.    Your provider has referred you to Diabetes Education: For all [facility]:  Phone [phone]; Fax [phone]  Please call and make the appointment.      Recommend checking blood sugars before meals and at bedtime.    If Blood glucose are low more often-> 2-3 times/week- give us a call.  The patient is advised to Make better food choices: reduce carbs, Reduce portion size, weight loss and exercise 3-4 times a week.  Discussed hypoglycemia signs and symptoms as well as management in detail.

## 2020-10-27 NOTE — PROGRESS NOTES
"THIS IS A VIDEO VISIT:    Phone call visit/virtual visit encounter:    Name of patient: Bandar Cadena    Date of encounter: 10/27/2020    Time of start of video visit: 3:35    Video started:  3:41    Video ended: 3:52    Time visit video ended: 4:04    Provider location: working from [place]/ [facility]    Patient location: Patients home.    Mode of transmission: Video/ Doximity    Verbal consent: obtained before starting visit. Pt is agreeable.      The patient has been notified of following:      \"This VIDEO visit will be conducted via a call between you and your physician/provider. We have found that certain health care needs can be provided without the need for a physical exam.  This service lets us provide the care you need with a short phone conversation.  If a prescription is necessary we can send it directly to your pharmacy.  If lab work is needed we can place an order for that and you can then stop by our lab to have the test done at a later time.     With new updates with corona virus patient might be billed as clinic visit.     If during the course of the call the physician/provider feels a telephone visit is not appropriate, you will not be charged for this service.\"      Past medical history, social history, family history, allergy and medications were reviewed and updated as appropriate.  Reviewed pertinent labs, notes, imaging studies personally.    Endocrinology Clinic Note:  Name: Bandar Cadena  Seen for f/u of type 1 Diabetes.  HPI:  Bandar Cadena is a 24 year old male who presents for the evaluation/management of type 1 diabetes.   has a past medical history of Asthma and Diabetes mellitus type 1 (H).     History of noncompliance. Historically poorly controlled diabetes.   Missing insulin doses once a day. Mostly novolog.  Sometimes low BG in the setting of miscalculation.    He has never been on insulin pump.  Was on Dexcom in past but reports that it is not covered.  In last clinic visit " he was referred to CDE but did not follow up.    Was seen at [facility] of [place].  No records to review.     1. Type 1 DM:  Orginally diagnosed at the age of: 9.   Current Regimen:   Basaglar 28 units at night.  Novolog about 30 Units/day ( 2 units/25 gm of CHO. About 6-10 units with each meal)    Diabetes Medication(s)     Diabetic Other       glucagon 1 MG kit    Inject 1 mg into the muscle as needed for low blood sugar    Insulin       insulin glargine (BASAGLAR KWIKPEN) 100 UNIT/ML pen    Inject 30 Units Subcutaneous At Bedtime     insulin lispro (HUMALOG KWIKPEN) 100 UNIT/ML (1 unit dial) KWIKPEN    INJECT 2 UNITS/15 GM CARBOHYDRATE THREE TIMES DAILY WITH MEALS PLUS CORRECTIONAL SCALE 1 UNIT/50 MG OVER 150, 2 UNITS/201-250 ETC(MAX 45 PER DAY).          BS checks: few times a week. Not everyday.  BG is variable.  Average Meter Download: reviewed. See attachments on [employer].  Exercise: not much.   Last A1c: 12.3%  Symptoms of hypoglycemia (low blood sugar):   Using PUMP:      DM Complications:   Complications:   Diabetes Complications  Description / Detail    Diabetic Retinopathy  No   CAD / PAD  No   Neuropathy  No   Nephropathy / Microalbuminuria  No  Lab Results   Component Value Date    UMALCR 38.25 02/07/2019         Gastroparesis  No   Hypoglycemia [place]arness  No        2. Hypertension: Blood Pressure today:   BP Readings from Last 3 Encounters:   02/26/20 104/72   02/13/20 106/55   04/19/19 120/70     3. Hyperlipidemia:  On medication    PMH/PSH:  Past Medical History:   Diagnosis Date     Asthma      Diabetes mellitus type 1 (H)      History reviewed. No pertinent surgical history.  Family Hx:  Family History   Problem Relation Age of Onset     No Known Problems Mother      No Known Problems Father            DM2: Grandpa and uncle.           Social Hx:  Social History     Socioeconomic History     Marital status:      Spouse name: Not on file     Number of children: Not on file      Years of education: Not on file     Highest education level: Not on file   Occupational History     Not on file   Social Needs     Financial resource strain: Not on file     Food insecurity     Worry: Not on file     Inability: Not on file     Transportation needs     Medical: Not on file     Non-medical: Not on file   Tobacco Use     Smoking status: Never Smoker     Smokeless tobacco: Never Used   Substance and Sexual Activity     Alcohol use: No     Drug use: No     Sexual activity: Yes     Partners: Female   Lifestyle     Physical activity     Days per week: Not on file     Minutes per session: Not on file     Stress: Not on file   Relationships     Social connections     Talks on phone: Not on file     Gets together: Not on file     Attends [employer] service: Not on file     Active member of club or organization: Not on file     Attends meetings of clubs or organizations: Not on file     Relationship status: Not on file     Intimate partner violence     Fear of current or ex partner: Not on file     Emotionally abused: Not on file     Physically abused: Not on file     Forced sexual activity: Not on file   Other Topics Concern     Not on file   Social History Narrative     Not on file          MEDICATIONS:  has a current medication list which includes the following prescription(s): albuterol, aspirin not prescribed, glucagon, ibuprofen, insulin glargine, insulin lispro, pravastatin, and statin not prescribed.    ROS     ROS: 10 point ROS neg other than the symptoms noted above in the HPI.    Physical Exam   VS: There were no vitals taken for this visit.  GENERAL: healthy, alert and no distress  EYES: Eyes grossly normal to inspection, conjunctivae and sclerae normal  RESP: no audible wheeze, cough, or visible cyanosis.  No visible retractions or increased work of breathing.  Able to speak fully in complete sentences.  NEURO: Cranial nerves grossly intact, mentation intact and speech normal  PSYCH: mentation  appears normal, affect normal/bright, judgement and insight intact, normal speech and appearance well-groomed        LABS:  A1c:  Lab Results   Component Value Date    A1C 11.3 10/26/2020    A1C 12.3 06/30/2020    A1C 11.2 02/12/2020    A1C 12.7 02/07/2019    A1C 12.5 07/20/2018       BMP:  Creatinine   Date Value Ref Range Status   02/13/2020 0.79 0.66 - 1.25 mg/dL Final       Urine Micro:  Lab Results   Component Value Date    UMALCR 6.90 06/30/2020        LFTs/Lipids:  Recent Labs   Lab Test 06/30/20  1149   CHOL 191   HDL 43   *   TRIG 86       TFTs:  TSH   Date Value Ref Range Status   06/30/2020 2.28 0.40 - 4.00 mU/L Final       Blood Glucose and pump data/ Meter reviewed.     All pertinent notes, labs, and images personally reviewed by me.         Glucometer/ insulin pump (if applicable)/ CGM data (if applicable) downloaded, Personally reviewed and interpreted.  All Blood sugar data reviewed personally and discussed with pt.  See nursing note from today's clinic visit for details of BG/CGM log.  All past medical, social and Family history reviewed and updated in [place] [place].    A/P  Mr.Ethan PAT Cadena is a 25 year old here for the evaluation/management of diabetes:    1. DM1 - Under Poor control.  A1c 11.3%.  No known complications from diabetes.  Historically diabetes is under poor control.  Missing Humalog multiple times.  He is interested to know more about her insulin pump as well as continuous glucose monitors.  Plan:  Discussed diagnosis, pathophysiology, management and treatment options of condition with pt.  I also discussed importance of strict blood sugar control to prevent complications associated with uncontrolled diabetes.  Increase Basaglar to 30 units/day.  Continue NovoLog at current dose.  Follow-up with diabetic educator in 6 weeks  Freestyle amauri prescription sent.  Check cost with insurance  If it is not covered I highly recommend diagnostic continuous glucose monitor.  That will be placed  by diabetic educator.  We do have diabetic educators in few locations who are seeing patients in clinic.  Please coordinate accordingly  Consider insulin pump once blood sugars are under better control.      2. Hypertension - .Not on medication.    3. Hyperlipidemia - Under poor control.   LDL > 130.  On Pravastatin 40 mg /day ( started 7/1/2020).  4. Prevention  Flu Shot-recommend annually.  Ophthalmology-recommend annually.  ASA-not indicated secondary to age.  Smoking-no.    Most Recent Immunizations   Administered Date(s) Administered     DTAP (<7y) 08/27/1998     Flu, Unspecified 12/12/1996     Hep B, Peds or Adolescent 04/07/1994     HepB, Unspecified 04/07/1994     Historical HIB 12/19/1994     Influenza (IIV3) PF 10/30/2008     Influenza Vaccine IM > 6 months Valent IIV4 10/01/2020     Influenza,INJ,MDCK,PF,Quad >4yrs 10/05/2019     MMR 08/23/2000     Meningococcal (Menveo ) 09/19/2011     OPV, trivalent, live 08/27/1998     Poliovirus, inactivated (IPV) 08/27/1998     TDAP Vaccine (Boostrix) 07/30/2014     TRIHIBIT (DTAP/HIB, <7y) 08/27/1998     Td (Adult), Adsorbed 10/07/2004     Varicella 09/19/2011         Recommend checking blood sugars before meals and at bedtime.    If Blood glucose are low more often-> 2-3 times/week- give us a call.  The patient is advised to Make better food choices: reduce carbs, Reduce portion size, weight loss and exercise 3-4 times a week.  Discussed hypoglycemia signs and symptoms as well as management in detail.      There is some variability among people, most will usually develop symptoms suggestive of hypoglycemia when blood glucose levels are lowered to the mid 60's. The first set of symptoms are called adrenergic. Patients may experience any of the following nervousness, sweating, intense hunger, trembling, weakness, palpitations, and difficulty speaking. When BS fall below 50 the patient is unable to talk and take oral therapy.  Would recommend Glucagon emergency kit  for the patient and education for family and friends around the patient.   The acute management of hypoglycemia involves the rapid delivery of a source of easily absorbed sugar. Regular soda, juice, lifesavers, table sugar, are good options. 15 grams of glucose is the dose that is given, followed by an assessment of symptoms and a blood glucose check if possible. If after 10 minutes there is no improvement, another 10-15 grams should be given. This can be repeated up to three times. The equivalency of 10-15 grams of glucose (approximate servings) are: Four lifesavers, 4 teaspoons of sugar, or 1/2 cup or 4 oz of juice or regular pop.    Follow-up:  3 months    Kaya Fernandez M.D  Endocrinology  [facility]  CC: Clinic, [facility]      All questions were answered.  The patient indicates understanding of the above issues and agrees with the plan set forth.     Disclaimer: This note consists of symbols derived from keyboarding, dictation and/or voice recognition software. As a result, there may be errors in the script that have gone undetected. Please consider this when interpreting information found in this chart.    Addendum to above note and clinic visit:    Labs reviewed.    See result note/telephone encounter.

## 2020-10-27 NOTE — LETTER
"    10/27/2020         RE: Bandar Cadena  [address]  [place] [address]        Dear Colleague,    Thank you for referring your patient, Bandar Cadena, to the [facility]. Please see a copy of my visit note below.    THIS IS A VIDEO VISIT:    Phone call visit/virtual visit encounter:    Name of patient: Bandar Cadena    Date of encounter: 10/27/2020    Time of start of video visit: 3:35    Video started:  3:41    Video ended: 3:52    Time visit video ended: 4:04    Provider location: working from [place]/ [facility]    Patient location: Patients home.    Mode of transmission: Video/ Doximity    Verbal consent: obtained before starting visit. Pt is agreeable.      The patient has been notified of following:      \"This VIDEO visit will be conducted via a call between you and your physician/provider. We have found that certain health care needs can be provided without the need for a physical exam.  This service lets us provide the care you need with a short phone conversation.  If a prescription is necessary we can send it directly to your pharmacy.  If lab work is needed we can place an order for that and you can then stop by our lab to have the test done at a later time.     With new updates with corona virus patient might be billed as clinic visit.     If during the course of the call the physician/provider feels a telephone visit is not appropriate, you will not be charged for this service.\"      Past medical history, social history, family history, allergy and medications were reviewed and updated as appropriate.  Reviewed pertinent labs, notes, imaging studies personally.    Endocrinology Clinic Note:  Name: Bandar Cadena  Seen for f/u of type 1 Diabetes.  HPI:  Bandar Cadena is a 24 year old male who presents for the evaluation/management of type 1 diabetes.   has a past medical history of Asthma and Diabetes mellitus type 1 (H).     History of noncompliance. " Historically poorly controlled diabetes.   Missing insulin doses once a day. Mostly novolog.  Sometimes low BG in the setting of miscalculation.    He has never been on insulin pump.  Was on Dexcom in past but reports that it is not covered.  In last clinic visit he was referred to CDE but did not follow up.    Was seen at [facility]inology clinic of [place].  No records to review.     1. Type 1 DM:  Orginally diagnosed at the age of: 9.   Current Regimen:   Basaglar 28 units at night.  Novolog about 30 Units/day ( 2 units/25 gm of CHO. About 6-10 units with each meal)    Diabetes Medication(s)     Diabetic Other       glucagon 1 MG kit    Inject 1 mg into the muscle as needed for low blood sugar    Insulin       insulin glargine (BASAGLAR KWIKPEN) 100 UNIT/ML pen    Inject 30 Units Subcutaneous At Bedtime     insulin lispro (HUMALOG KWIKPEN) 100 UNIT/ML (1 unit dial) KWIKPEN    INJECT 2 UNITS/15 GM CARBOHYDRATE THREE TIMES DAILY WITH MEALS PLUS CORRECTIONAL SCALE 1 UNIT/50 MG OVER 150, 2 UNITS/201-250 ETC(MAX 45 PER DAY).          BS checks: few times a week. Not everyday.  BG is variable.  Average Meter Download: reviewed. See attachments on [employer].  Exercise: not much.   Last A1c: 12.3%  Symptoms of hypoglycemia (low blood sugar):   Using PUMP:      DM Complications:   Complications:   Diabetes Complications  Description / Detail    Diabetic Retinopathy  No   CAD / PAD  No   Neuropathy  No   Nephropathy / Microalbuminuria  No  Lab Results   Component Value Date    UMALCR 38.25 02/07/2019         Gastroparesis  No   Hypoglycemia [place]ar[place]  No        2. Hypertension: Blood Pressure today:   BP Readings from Last 3 Encounters:   02/26/20 104/72   02/13/20 106/55   04/19/19 120/70     3. Hyperlipidemia:  On medication    PMH/PSH:  Past Medical History:   Diagnosis Date     Asthma      Diabetes mellitus type 1 (H)      History reviewed. No pertinent surgical history.  Family Hx:  Family History   Problem Relation  Age of Onset     No Known Problems Mother      No Known Problems Father            DM2: Grandpa and uncle.           Social Hx:  Social History     Socioeconomic History     Marital status:      Spouse name: Not on file     Number of children: Not on file     Years of education: Not on file     Highest education level: Not on file   Occupational History     Not on file   Social Needs     Financial resource strain: Not on file     Food insecurity     Worry: Not on file     Inability: Not on file     Transportation needs     Medical: Not on file     Non-medical: Not on file   Tobacco Use     Smoking status: Never Smoker     Smokeless tobacco: Never Used   Substance and Sexual Activity     Alcohol use: No     Drug use: No     Sexual activity: Yes     Partners: Female   Lifestyle     Physical activity     Days per week: Not on file     Minutes per session: Not on file     Stress: Not on file   Relationships     Social connections     Talks on phone: Not on file     Gets together: Not on file     Attends Confucianism service: Not on file     Active member of club or organization: Not on file     Attends meetings of clubs or organizations: Not on file     Relationship status: Not on file     Intimate partner violence     Fear of current or ex partner: Not on file     Emotionally abused: Not on file     Physically abused: Not on file     Forced sexual activity: Not on file   Other Topics Concern     Not on file   Social History Narrative     Not on file          MEDICATIONS:  has a current medication list which includes the following prescription(s): albuterol, aspirin not prescribed, glucagon, ibuprofen, insulin glargine, insulin lispro, pravastatin, and statin not prescribed.    ROS     ROS: 10 point ROS neg other than the symptoms noted above in the HPI.    Physical Exam   VS: There were no vitals taken for this visit.  GENERAL: healthy, alert and no distress  EYES: Eyes grossly normal to inspection, conjunctivae  and sclerae normal  RESP: no audible wheeze, cough, or visible cyanosis.  No visible retractions or increased work of breathing.  Able to speak fully in complete sentences.  NEURO: Cranial nerves grossly intact, mentation intact and speech normal  PSYCH: mentation appears normal, affect normal/bright, judgement and insight intact, normal speech and appearance well-groomed        LABS:  A1c:  Lab Results   Component Value Date    A1C 11.3 10/26/2020    A1C 12.3 06/30/2020    A1C 11.2 02/12/2020    A1C 12.7 02/07/2019    A1C 12.5 07/20/2018       BMP:  Creatinine   Date Value Ref Range Status   02/13/2020 0.79 0.66 - 1.25 mg/dL Final       Urine Micro:  Lab Results   Component Value Date    UMALCR 6.90 06/30/2020        LFTs/Lipids:  Recent Labs   Lab Test 06/30/20  1149   CHOL 191   HDL 43   *   TRIG 86       TFTs:  TSH   Date Value Ref Range Status   06/30/2020 2.28 0.40 - 4.00 mU/L Final       Blood Glucose and pump data/ Meter reviewed.     All pertinent notes, labs, and images personally reviewed by me.         Glucometer/ insulin pump (if applicable)/ CGM data (if applicable) downloaded, Personally reviewed and interpreted.  All Blood sugar data reviewed personally and discussed with pt.  See nursing note from today's clinic visit for details of BG/CGM log.  All past medical, social and Family history reviewed and updated in [facility].    A/P  Mr.Ethan PAT Cadena is a 25 year old here for the evaluation/management of diabetes:    1. DM1 - Under Poor control.  A1c 11.3%.  No known complications from diabetes.  Historically diabetes is under poor control.  Missing Humalog multiple times.  He is interested to know more about her insulin pump as well as continuous glucose monitors.  Plan:  Discussed diagnosis, pathophysiology, management and treatment options of condition with pt.  I also discussed importance of strict blood sugar control to prevent complications associated with uncontrolled diabetes.  Increase  Basaglar to 30 units/day.  Continue NovoLog at current dose.  Follow-up with diabetic educator in 6 weeks  Freestyle amauri prescription sent.  Check cost with insurance  If it is not covered I highly recommend diagnostic continuous glucose monitor.  That will be placed by diabetic educator.  We do have diabetic educators in few locations who are seeing patients in clinic.  Please coordinate accordingly  Consider insulin pump once blood sugars are under better control.      2. Hypertension - .Not on medication.    3. Hyperlipidemia - Under poor control.   LDL > 130.  On Pravastatin 40 mg /day ( started 7/1/2020).  4. Prevention  Flu Shot-recommend annually.  Ophthalmology-recommend annually.  ASA-not indicated secondary to age.  Smoking-no.    Most Recent Immunizations   Administered Date(s) Administered     DTAP (<7y) 08/27/1998     Flu, Unspecified 12/12/1996     Hep B, Peds or Adolescent 04/07/1994     HepB, Unspecified 04/07/1994     Historical HIB 12/19/1994     Influenza (IIV3) PF 10/30/2008     Influenza Vaccine IM > 6 months Valent IIV4 10/01/2020     Influenza,INJ,MDCK,PF,Quad >4yrs 10/05/2019     MMR 08/23/2000     Meningococcal (Menveo ) 09/19/2011     OPV, trivalent, live 08/27/1998     Poliovirus, inactivated (IPV) 08/27/1998     TDAP Vaccine (Boostrix) 07/30/2014     TRIHIBIT (DTAP/HIB, <7y) 08/27/1998     Td (Adult), Adsorbed 10/07/2004     Varicella 09/19/2011         Recommend checking blood sugars before meals and at bedtime.    If Blood glucose are low more often-> 2-3 times/week- give us a call.  The patient is advised to Make better food choices: reduce carbs, Reduce portion size, weight loss and exercise 3-4 times a week.  Discussed hypoglycemia signs and symptoms as well as management in detail.      There is some variability among people, most will usually develop symptoms suggestive of hypoglycemia when blood glucose levels are lowered to the mid 60's. The first set of symptoms are called  adrenergic. Patients may experience any of the following nervousness, sweating, intense hunger, trembling, weakness, palpitations, and difficulty speaking. When BS fall below 50 the patient is unable to talk and take oral therapy.  Would recommend Glucagon emergency kit for the patient and education for family and friends around the patient.   The acute management of hypoglycemia involves the rapid delivery of a source of easily absorbed sugar. Regular soda, juice, lifesavers, table sugar, are good options. 15 grams of glucose is the dose that is given, followed by an assessment of symptoms and a blood glucose check if possible. If after 10 minutes there is no improvement, another 10-15 grams should be given. This can be repeated up to three times. The equivalency of 10-15 grams of glucose (approximate servings) are: Four lifesavers, 4 teaspoons of sugar, or 1/2 cup or 4 oz of juice or regular pop.    Follow-up:  3 months    Kaya Fernandez M.D  Endocrinology  [facility]  CC: Clinic, [facility]      All questions were answered.  The patient indicates understanding of the above issues and agrees with the plan set forth.     Disclaimer: This note consists of symbols derived from keyboarding, dictation and/or voice recognition software. As a result, there may be errors in the script that have gone undetected. Please consider this when interpreting information found in this chart.    Addendum to above note and clinic visit:    Labs reviewed.    See result note/telephone encounter.              Again, thank you for allowing me to participate in the care of your patient.        Sincerely,        Kaya Fernandez MD

## 2020-11-02 DIAGNOSIS — E10.9 TYPE 1 DIABETES MELLITUS WITHOUT COMPLICATION (H): ICD-10-CM

## 2020-11-03 ENCOUNTER — MYC MEDICAL ADVICE (OUTPATIENT)
Dept: ENDOCRINOLOGY | Facility: CLINIC | Age: 25
End: 2020-11-03

## 2020-11-03 DIAGNOSIS — E10.9 TYPE 1 DIABETES MELLITUS WITHOUT COMPLICATION (H): ICD-10-CM

## 2020-11-03 RX ORDER — INSULIN GLARGINE 100 [IU]/ML
INJECTION, SOLUTION SUBCUTANEOUS
Qty: 15 ML | Refills: 1 | OUTPATIENT
Start: 2020-11-03

## 2020-11-03 RX ORDER — INSULIN GLARGINE 100 [IU]/ML
INJECTION, SOLUTION SUBCUTANEOUS
Qty: 15 ML | Refills: 1 | Status: SHIPPED | OUTPATIENT
Start: 2020-11-03 | End: 2021-02-22

## 2020-11-03 RX ORDER — INSULIN LISPRO 100 [IU]/ML
INJECTION, SOLUTION INTRAVENOUS; SUBCUTANEOUS
Qty: 15 ML | Refills: 1 | Status: SHIPPED | OUTPATIENT
Start: 2020-11-03 | End: 2021-02-22

## 2020-11-03 NOTE — TELEPHONE ENCOUNTER
Patient sent a myc message in regards to the refill requests from today and yesterday.  Please see those encounters.  Will close this encounter.

## 2020-11-03 NOTE — TELEPHONE ENCOUNTER
"Prescription approved per [facility] Refill Protocol.        Requested Prescriptions   Pending Prescriptions Disp Refills     LANTUS SOLOSTAR 100 UNIT/ML soln [Pharmacy Med Name: LANTUS SOLOSTAR PEN INJ 3ML] 15 mL 1     Sig: ADMINISTER 30 UNITS UNDER THE SKIN AT BEDTIME       Long Acting Insulin Protocol Passed - 11/3/2020  4:40 AM        Passed - Serum creatinine on file in past 12 months     Recent Labs   Lab Test 02/13/20  0647   CR 0.79       Ok to refill medication if creatinine is low          Passed - HgbA1C in past 3 or 6 months     If HgbA1C is 8 or greater, it needs to be on file within the past 3 months.  If less than 8, must be on file within the past 6 months.     Recent Labs   Lab Test 10/26/20  1246   A1C 11.3*             Passed - Medication is active on med list        Passed - Patient is age 18 or older        Passed - Recent (6 mo) or future (30 days) visit within the authorizing provider's specialty     Patient had office visit in the last 6 months or has a visit in the next 30 days with authorizing provider or within the authorizing provider's specialty.  See \"Patient Info\" tab in inbasket, or \"Choose Columns\" in Meds & Orders section of the refill encounter.               insulin lispro (HUMALOG KWIKPEN) 100 UNIT/ML (1 unit dial) KWIKPEN [Pharmacy Med Name: INSULIN LISPRO 100U/ML KWIKPEN 3ML] 15 mL 1     Sig: INJECT 2 UNITS/15 GM CARBOHYDRATE THREE TIMES DAILY WITH MEALS PLUS CORRECTIONAL SCALE 1 UNIT/50 MG OVER 150, 2 UNITS/201-250 ETC(MAX 45 PER DAY).       Short Acting Insulin Protocol Passed - 11/3/2020  4:40 AM        Passed - Serum creatinine on file in past 12 months     Recent Labs   Lab Test 02/13/20  0647   CR 0.79       Ok to refill medication if creatinine is low          Passed - HgbA1C in past 3 or 6 months     If HgbA1C is 8 or greater, it needs to be on file within the past 3 months.  If less than 8, must be on file within the past 6 months.     Recent Labs   Lab Test 10/26/20  1246 " "  A1C 11.3*             Passed - Medication is active on med list        Passed - Patient is age 18 or older        Passed - Recent (6 mo) or future (30 days) visit within the authorizing provider's specialty     Patient had office visit in the last 6 months or has a visit in the next 30 days with authorizing provider or within the authorizing provider's specialty.  See \"Patient Info\" tab in inbasket, or \"Choose Columns\" in Meds & Orders section of the refill encounter.                 "

## 2020-12-01 ENCOUNTER — MYC MEDICAL ADVICE (OUTPATIENT)
Dept: ENDOCRINOLOGY | Facility: CLINIC | Age: 25
End: 2020-12-01

## 2020-12-07 NOTE — TELEPHONE ENCOUNTER
DMV DM form    LAST OFFICE/VIRTUAL VISIT:  07/01/20    FUTURE OFFICE/VIRTUAL VISIT:  08/27/20    Lab Results   Component Value Date    A1C 12.3 06/30/2020    A1C 11.2 02/12/2020    A1C 12.7 02/07/2019    A1C 12.5 07/20/2018         Elmira Silva CMA  [facility]  [place]os       Detail Level: Detailed Depth Of Biopsy: dermis Was A Bandage Applied: Yes Size Of Lesion In Cm: 0 Biopsy Type: H and E Biopsy Method: Dermablade Anesthesia Type: 1% lidocaine with epinephrine Anesthesia Volume In Cc (Will Not Render If 0): 0.5 Hemostasis: Drysol Wound Care: Bacitracin Dressing: bandage Destruction After The Procedure: No Type Of Destruction Used: Curettage Curettage Text: The wound bed was treated with curettage after the biopsy was performed. Cryotherapy Text: The wound bed was treated with cryotherapy after the biopsy was performed. Electrodesiccation Text: The wound bed was treated with electrodesiccation after the biopsy was performed. Electrodesiccation And Curettage Text: The wound bed was treated with electrodesiccation and curettage after the biopsy was performed. Silver Nitrate Text: The wound bed was treated with silver nitrate after the biopsy was performed. Lab: [id number] Lab Facility: [id number] Consent: Written consent was obtained and risks were reviewed including but not limited to scarring, infection, bleeding, scabbing, incomplete removal, nerve damage and allergy to anesthesia. Post-Care Instructions: I reviewed with the patient in detail post-care instructions. Patient is to keep the biopsy site dry overnight, and then apply bacitracin twice daily until healed. Patient may apply hydrogen peroxide soaks to remove any crusting. Notification Instructions: Patient will be notified of biopsy results. However, patient instructed to call the office if not contacted within 2 weeks. Billing Type: Third-Party Bill Information: Selecting Yes will display possible errors in your note based on the variables you have selected. This validation is only offered as a suggestion for you. PLEASE NOTE THAT THE VALIDATION TEXT WILL BE REMOVED WHEN YOU FINALIZE YOUR NOTE. IF YOU WANT TO FAX A PRELIMINARY NOTE YOU WILL NEED TO TOGGLE THIS TO 'NO' IF YOU DO NOT WANT IT IN YOUR FAXED NOTE.

## 2021-01-03 ENCOUNTER — HEALTH MAINTENANCE LETTER (OUTPATIENT)
Age: 26
End: 2021-01-03

## 2021-02-11 ENCOUNTER — TELEPHONE (OUTPATIENT)
Dept: ENDOCRINOLOGY | Facility: CLINIC | Age: 26
End: 2021-02-11

## 2021-02-11 NOTE — TELEPHONE ENCOUNTER
Diabetes Education Scheduling Outreach #1:    Call to patient to schedule. Left message with phone number to call to schedule.    Plan for 2nd outreach attempt within 1 week.    Laura Kee  [facility]  Diabetes and Nutrition Scheduling

## 2021-02-19 DIAGNOSIS — E10.9 TYPE 1 DIABETES MELLITUS WITHOUT COMPLICATION (H): ICD-10-CM

## 2021-02-19 NOTE — TELEPHONE ENCOUNTER
Pending Prescriptions:                       Disp   Refills    LANTUS SOLOSTAR 100 UNIT/ML soln [Pharmacy*15 mL  1        Sig: ADMINISTER 30 UNITS UNDER THE SKIN AT BEDTIME    insulin lispro (HUMALOG KWIKPEN) 100 UNIT/*15 mL  1        Sig: INJECT 2 UNITS/15 GM CARBOHYDRATE THREE TIMES DAILY           WITH MEALS PLUS CORRECTIONAL SCALE 1 UNIT/50 MG           OVER 150, 2 UNITS/201-250 ETC(MAX 45 PER DAY).    Routing refill request to provider for review/approval because:  Patient fails protocol

## 2021-02-21 ENCOUNTER — MYC MEDICAL ADVICE (OUTPATIENT)
Dept: ENDOCRINOLOGY | Facility: CLINIC | Age: 26
End: 2021-02-21

## 2021-02-22 ENCOUNTER — MYC MEDICAL ADVICE (OUTPATIENT)
Dept: ENDOCRINOLOGY | Facility: CLINIC | Age: 26
End: 2021-02-22

## 2021-02-22 RX ORDER — INSULIN LISPRO 100 [IU]/ML
INJECTION, SOLUTION INTRAVENOUS; SUBCUTANEOUS
Qty: 15 ML | Refills: 1 | Status: SHIPPED | OUTPATIENT
Start: 2021-02-22 | End: 2021-07-21

## 2021-02-22 RX ORDER — INSULIN GLARGINE 100 [IU]/ML
INJECTION, SOLUTION SUBCUTANEOUS
Qty: 15 ML | Refills: 1 | Status: SHIPPED | OUTPATIENT
Start: 2021-02-22 | End: 2021-05-21

## 2021-03-25 ENCOUNTER — VIRTUAL VISIT (OUTPATIENT)
Dept: EDUCATION SERVICES | Facility: CLINIC | Age: 26
End: 2021-03-25
Attending: INTERNAL MEDICINE
Payer: COMMERCIAL

## 2021-03-25 DIAGNOSIS — E10.9 TYPE 1 DIABETES MELLITUS WITHOUT COMPLICATION (H): ICD-10-CM

## 2021-03-25 PROCEDURE — G0108 DIAB MANAGE TRN  PER INDIV: HCPCS | Mod: 95

## 2021-03-25 NOTE — PROGRESS NOTES
"Diabetes Self-Management Education & Support  Type of service:  Video Visit    If the video visit is dropped, the video visit invitation should be resent by: Send to e-mail at: [email or web address]    Originating Location (pt. Location): Home  Distant Location (provider location): Home  Mode of Communication:  Video Conference via [employer]    Video Start Time: 3:28  Video End Time (time video stopped): 4:28    How would patient like to obtain AVS? Mark      Presents for: Individual review    SUBJECTIVE/OBJECTIVE:  Presents for: Individual review  Accompanied by: Self  Diabetes education in the past 24mo: No  Focus of Visit: Insulin Pump, CGM, Taking Medication  Type of Pump visit: Pre-pump  Diabetes type: Type 1  Disease course: Worsening  How confident are you filling out medical forms by yourself:: Extremely  Diabetes management related comments/concerns: I know I need to get better in control to get a pump  Transportation concerns: No  Difficulty affording diabetes medication?: No  Difficulty affording diabetes testing supplies?: No  Other concerns:: None  Cultural Influences/Ethnic Background:  American    Diabetes Symptoms & Complications:          Patient Problem List and Family Medical History reviewed for relevant medical history, current medical status, and diabetes risk factors.    Vitals:  There were no vitals taken for this visit.  Estimated body mass index is 20.87 kg/m  as calculated from the following:    Height as of 2/26/20: 1.854 m (6' 1\").    Weight as of 2/26/20: 71.8 kg (158 lb 3.2 oz).   Last 3 BP:   BP Readings from Last 3 Encounters:   02/26/20 104/72   02/13/20 106/55   04/19/19 120/70       History   Smoking Status     Never Smoker   Smokeless Tobacco     Never Used       Labs:  Lab Results   Component Value Date    A1C 11.3 10/26/2020     Lab Results   Component Value Date     02/13/2020     Lab Results   Component Value Date     10/26/2020     HDL Cholesterol   Date " Value Ref Range Status   10/26/2020 41 >39 mg/dL Final   ]  GFR Estimate   Date Value Ref Range Status   02/13/2020 >90 >60 mL/min/[1.73_m2] Final     Comment:     Non  GFR Calc  Starting 12/18/2018, serum creatinine based estimated GFR (eGFR) will be   calculated using the Chronic Kidney Disease Epidemiology Collaboration   (CKD-EPI) equation.       GFR Estimate If Black   Date Value Ref Range Status   02/13/2020 >90 >60 mL/min/[1.73_m2] Final     Comment:      GFR Calc  Starting 12/18/2018, serum creatinine based estimated GFR (eGFR) will be   calculated using the Chronic Kidney Disease Epidemiology Collaboration   (CKD-EPI) equation.       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[employer]- bagel (whole wheat) and orange juice  This is the time when most lows will happen- 7:40am usual low time  Snack: 8:15am- gold fish, granola bar  10:15am- granola bar  12:15pm- lunch- chicken noodle soup, apple  No lows usually  0-8mp-Unkuke- pizza- took 14 units       Vegetable-Wife is pregnant- eating a salad next to wife makes her nauseous- will try to 1-2 servings at work  Fruit- usually 1-2 servings per day (apple or banana for first few breaks)     Being Active:  Being Active Assessed Today: Yes  Exercise:: Currently not exercising(works an active job)  Barrier to exercise: None    Monitoring:  Monitoring Assessed Today: Yes  Did patient bring glucose meter to appointment? : Yes  Blood Glucose Meter: CGM(uses the freestyle amauri reader, sometimes phone)  Times  checking blood sugar at home (number): 5+  Times checking blood sugar at home (per): Day  Blood glucose trend: Fluctuating    Am blood sugar usually 200ish - CGM line appears to drop at 8pm with slight down trend in the evening 250-200   Currently using the freestyle amauri    Time in range:  Above: 73%  IN target: 20%  Low: 7%    Taking Medications:  Diabetes Medication(s)     Diabetic Other       glucagon 1 MG kit    Inject 1 mg into the muscle as needed for low blood sugar    Insulin       insulin lispro (HUMALOG KWIKPEN) 100 UNIT/ML (1 unit dial) KWIKPEN    INJECT 2 UNITS/15 GM CARBOHYDRATE THREE TIMES DAILY WITH MEALS PLUS CORRECTIONAL SCALE 1 UNIT/50 MG OVER 150, 2 UNITS/201-250 ETC(MAX 45 PER DAY).     LANTUS SOLOSTAR 100 UNIT/ML soln    ADMINISTER 30 UNITS UNDER THE SKIN AT BEDTIME      takes Lantus at 8:30-8pm    Lows are more random- usually due to over correction  -Takes Humalog- 3-4 times per day    Estimates total Humalog 30-40 units    Try taking Humalog 10 minutes before eating    Will only correct every 3 hours will take some 1/2 corrections for first 2 work breaks    Taking Medication Assessed Today: Yes  Current Treatments: Insulin Injections  Dose schedule: Pre-breakfast, Pre-lunch, Pre-dinner, At bedtime  Given by: Patient  Injection/Infusion sites: Abdomen  Problems taking diabetes medications regularly?: Yes(sometimes misses Humalog OR will skip it for snacks)  Diabetes medication side effects?: Yes    Problem Solving:  Problem Solving Assessed Today: Yes  Is the patient at risk for hypoglycemia?: Yes  Hypoglycemia Frequency: Daily  Hypoglycemia Treatment: Other food  Does patient have glucagon emergency kit?: Yes  Is the patient at risk for DKA?: Yes    Reducing Risks:  Diabetes Risks: None  CAD Risks: Diabetes Mellitus, Male sex    Healthy Coping:  Informal Support system:: Family  Stage of change: PREPARATION (Decided to change - considering how)  Support resources: Websites  Patient  "Activation Measure Survey Score:  No flowsheet data found.    Diabetes knowledge and skills assessment:   Patient is knowledgeable in diabetes management concepts related to: Healthy Eating, Being Active and Monitoring    Patient needs further education on the following diabetes management concepts: Taking Medication and Insulin Pump Concepts Balancing glucose and insulin  Calculating boluses  Problem solving with insulin pump therapy (BG monitoring; hypoglycemia signs/symptoms, treatment (glucagon) and prevention; hyperglycemia signs/symptoms, treatment and prevention; ketones, DKA signs/symptoms and prevention)    Based on learning assessment above, most appropriate setting for further diabetes education would be: Individual setting.      INTERVENTIONS:       Education provided today on:  AADE Self-Care Behaviors:  Healthy Eating: Discussed focusing in increasing fiber and pairing fruit with fat/protein to blunt the spike.  Encouraged patient to focus on getting 25-30g fiber to see if he notices an improvement in post meal blood sugar spikes.  Additionally discussed jesus \"undermyfork\" that can be used with cgm to analyze meals with time in range.    Monitoring: Provided clinic code to connect phone jesus data to clinic for future visits.      Taking Medication: Discussed taking Humalog 10 minutes prior to meal given he eats consistently.  Discussed how this would better match the insulin action with blood sugar rise due to rapid carbohydrate digestion.  Additionally discussed that he likely needs an increase to Lantus insulin given his report of post dinner blood sugars in the 200s, but they stay there overnight.  Discussed additionally that he could consider splitting the Lantus to provide more at night and less during the day.    Insulin Pump Concepts:  Balancing glucose and insulin  Carbohydrate counting  Calculating boluses  Problem solving with insulin pump therapy (BG monitoring; hypoglycemia signs/symptoms, " treatment (glucagon) and prevention; hyperglycemia signs/symptoms, treatment and prevention; ketones, DKA signs/symptoms and prevention)  Comparison of brands.    Discussed potential benefit to patient including: increased variety of sites, one hand bolus (first baby due in August 2021), increased bolusing for snacks, reverse calculations, and less mental math to figure out doses, 1/2 unit doses.  Discussed pros and cons to each pump and benefit of hybrid closed loop systems.  Patient was very interested in Tandem pump with Dexcom G6 as an option. Provided additional education on InPen concept with jesus calculation.    Opportunities for ongoing education and support in diabetes-self management were discussed.    Pt verbalized understanding of concepts discussed and recommendations provided today.       Education Materials Provided:  No new materials provided today      ASSESSMENT:  Patient has historically highly variable blood sugars and would benefit from an insulin pump to provide more tailored insulin delivery.  He reports some site scar tissue and would benefit from having increased site access on different parts of his body that he wouldn't use with pens.  He recently got a new job with [employer] and will be changing health insurance.  Recommend patient wait to pursue a pump until he is on the new insurance and would be able to assess insurance coverage.  He is motivated to pursue a pump prior to the birth of his first child to get to a stable point first if possible.  He currently counts in choices and likely is either under or over estimating carbohydrates which is leading to both lows and highs.      PLAN  See Patient Instructions for co-developed, patient-stated behavior change goals.  AVS printed and provided to patient today. See Follow-Up section for recommended follow-up.    Patient to review tandem and omnipod company information online.  If in 1 month he'd like to consider a pump meet with  educator in person to try out infusion sets and see pump in person for further review and additional download of freestyle amauri.    Patient to try taking Huamlog 10 minutes before start of meal.      Consider increase Lantus dose to up to 40 units, but may benefit from splitting doses to provide more at night.      Focus on increasing fiber at meals and pairing fruit with fat or protein foods.     Shantal Reid MS, RD, LD, CDE  Time Spent: 60 minutes  Encounter Type: Individual    Any diabetes medication dose changes were made via the CDE Protocol and Collaborative Practice Agreement with the patient's endocrinology provider. A copy of this encounter was shared with the provider.

## 2021-03-25 NOTE — PATIENT INSTRUCTIONS
Consider slowly increasing to 40 units Lantus-  And option to split doses such as 15 units in the morning and 25 at night     Take Humalog dose 10 minutes before your first bite of carbohydrate    Focus on increasing fiber up to 25-30g per day.      Try using [employer] jesus for tracking foods with blood sugars to identify meals that keep you more in range  https://[email or web address].org/diabetes-food-logging-csy-frjgyozqeju-natecynx-android-users-and-healthcare-professionals

## 2021-03-25 NOTE — LETTER
"    3/25/2021         RE: Bandar Cadena  [address]  [place] [address]        Dear Colleague,    Thank you for referring your patient, Bandar Cadena, to the [facility]. Please see a copy of my visit note below.      Lab Results   Component Value Date    A1C 11.3 10/26/2020    A1C 12.3 06/30/2020    A1C 11.2 02/12/2020    A1C 12.7 02/07/2019    A1C 12.5 07/20/2018     Wt Readings from Last 10 Encounters:   02/26/20 71.8 kg (158 lb 3.2 oz)   02/13/20 70.4 kg (155 lb 1.6 oz)   04/19/19 68.5 kg (151 lb)   02/07/19 69.7 kg (153 lb 11.2 oz)   07/20/18 67.4 kg (148 lb 9.4 oz)       6\"1'    Taking diabetes medications?   yes:     Diabetes Medication(s)     Diabetic Other       glucagon 1 MG kit    Inject 1 mg into the muscle as needed for low blood sugar    Insulin       insulin lispro (HUMALOG KWIKPEN) 100 UNIT/ML (1 unit dial) KWIKPEN    INJECT 2 UNITS/15 GM CARBOHYDRATE THREE TIMES DAILY WITH MEALS PLUS CORRECTIONAL SCALE 1 UNIT/50 MG OVER 150, 2 UNITS/201-250 ETC(MAX 45 PER DAY).     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[employer] -     Am blood sugar usually 200ish - CGM line appears to drop at 8pm with slight down trend in the evening 250-200   Breakfast- bagel (whole wheat) and orange juice  This is the time when most lows will happen- 7:40am usual low time  Snack: 8:15am- gold fish, granola bar  10:15am- granola bar  12:15pm- lunch- chicken noodle soup, apple  No lows usually  5-8mv-Tsvjtt- " pizza- took 14 units     Estimates total Humalog 30-40 units    Try taking Humalog 10 minutes before eating    Will only correct every 3 hours will take some 1/2 corrections for first 2 work breaks    Wife is pregnant- eating a salad next to wife makes her nauseous- will try to 1-2 servings at work  Vegetable-   Fruit- usually 1-2 servings per day (apple or banana for first few breaks)

## 2021-03-25 NOTE — Clinical Note
Discussed pumps, he's pretty interested in tandem, but will be changing jobs and insurance. Recommended he wait to pursue one until he's on the new insurance.  His wife is having a baby August 2021 so would like to get him on a pump prior to then.     Shantal

## 2021-04-01 ENCOUNTER — MYC MEDICAL ADVICE (OUTPATIENT)
Dept: ENDOCRINOLOGY | Facility: CLINIC | Age: 26
End: 2021-04-01

## 2021-04-02 NOTE — TELEPHONE ENCOUNTER
Patient sent a myc message requesting this form be completed.    DMV form  Placed on endo MA desk.

## 2021-04-06 NOTE — TELEPHONE ENCOUNTER
DMV DM Form    LAST OFFICE/VIRTUAL VISIT:  10/27/20    FUTURE OFFICE/VIRTUAL VISIT:  None    Lab Results   Component Value Date    A1C 11.3 10/26/2020    A1C 12.3 06/30/2020    A1C 11.2 02/12/2020    A1C 12.7 02/07/2019    A1C 12.5 07/20/2018         Elmira Silva CMA  [facility]  [place]Carlos

## 2021-04-08 NOTE — TELEPHONE ENCOUNTER
Forms/paperwork reviewed, completed and signed.  Please fax or send the papers as requested, document in chart and close the encounter.    Thank you.    Kaya Fernandez MD

## 2021-04-08 NOTE — TELEPHONE ENCOUNTER
Form was faxed and sent to scanning.      Message sent via [employer].      Elmira Silva CMA  [facility]  [place]os

## 2021-05-21 ENCOUNTER — MYC MEDICAL ADVICE (OUTPATIENT)
Dept: ENDOCRINOLOGY | Facility: CLINIC | Age: 26
End: 2021-05-21

## 2021-05-21 DIAGNOSIS — E10.9 TYPE 1 DIABETES MELLITUS WITHOUT COMPLICATION (H): ICD-10-CM

## 2021-05-21 RX ORDER — INSULIN GLARGINE 100 [IU]/ML
INJECTION, SOLUTION SUBCUTANEOUS
Qty: 15 ML | Refills: 0 | Status: SHIPPED | OUTPATIENT
Start: 2021-05-21 | End: 2021-07-21

## 2021-05-21 NOTE — TELEPHONE ENCOUNTER
Medication is being filled for 1 time refill only due to:  patient is due for an appointment     [employer] message sent to patient in other encounter from 5/21/21

## 2021-05-21 NOTE — TELEPHONE ENCOUNTER
"Requested Prescriptions   Pending Prescriptions Disp Refills     LANTUS SOLOSTAR 100 UNIT/ML soln [Pharmacy Med Name: LANTUS SOLOSTAR PEN INJ 3ML] 15 mL 1     Sig: ADMINISTER 30 UNITS UNDER THE SKIN AT BEDTIME       Long Acting Insulin Protocol Failed - 5/21/2021  2:57 PM        Failed - Serum creatinine on file in past 12 months     Recent Labs   Lab Test 02/13/20  0647   CR 0.79       Ok to refill medication if creatinine is low          Failed - HgbA1C in past 3 or 6 months     If HgbA1C is 8 or greater, it needs to be on file within the past 3 months.  If less than 8, must be on file within the past 6 months.     Recent Labs   Lab Test 10/26/20  1246   A1C 11.3*             Failed - Recent (6 mo) or future (30 days) visit within the authorizing provider's specialty     Patient had office visit in the last 6 months or has a visit in the next 30 days with authorizing provider or within the authorizing provider's specialty.  See \"Patient Info\" tab in inbasket, or \"Choose Columns\" in Meds & Orders section of the refill encounter.            Passed - Medication is active on med list        Passed - Patient is age 18 or older             "

## 2021-06-19 ENCOUNTER — HEALTH MAINTENANCE LETTER (OUTPATIENT)
Age: 26
End: 2021-06-19

## 2021-07-20 DIAGNOSIS — E10.9 TYPE 1 DIABETES MELLITUS WITHOUT COMPLICATION (H): ICD-10-CM

## 2021-07-20 RX ORDER — INSULIN GLARGINE 100 [IU]/ML
INJECTION, SOLUTION SUBCUTANEOUS
Qty: 15 ML | Refills: 0 | Status: CANCELLED | OUTPATIENT
Start: 2021-07-20

## 2021-07-21 RX ORDER — INSULIN GLARGINE 100 [IU]/ML
INJECTION, SOLUTION SUBCUTANEOUS
Qty: 15 ML | Refills: 0 | Status: SHIPPED | OUTPATIENT
Start: 2021-07-21 | End: 2021-09-08

## 2021-07-21 RX ORDER — INSULIN LISPRO 100 [IU]/ML
INJECTION, SOLUTION INTRAVENOUS; SUBCUTANEOUS
Qty: 15 ML | Refills: 1 | Status: SHIPPED | OUTPATIENT
Start: 2021-07-21 | End: 2021-10-12

## 2021-07-21 NOTE — TELEPHONE ENCOUNTER
Pending Prescriptions:                       Disp   Refills    LANTUS SOLOSTAR 100 UNIT/ML soln [Pharmacy*15 mL  0        Sig: ADMINISTER 30 UNITS UNDER THE SKIN AT BEDTIME    insulin lispro (HUMALOG KWIKPEN) 100 UNIT/*15 mL  1        Sig: INJECT 2 UNITS/15 GM CARBOHYDRATE THREE TIMES DAILY           WITH MEALS PLUS CORRECTIONAL SCALE 1 UNIT/50 MG           OVER 150, 2 UNITS/201-250 ETC(MAX 45 PER DAY).    LANTUS SOLOSTAR 100 UNIT/ML soln [Pharmacy*15 mL  0        Sig: ADMINISTER 30 UNITS UNDER THE SKIN AT BEDTIME  Routing refill request to provider for review/approval because:  Fails protocol

## 2021-07-21 NOTE — TELEPHONE ENCOUNTER
Rx sent.  Pt due for labs and f/u.    Lab Results   Component Value Date    A1C 11.3 10/26/2020    A1C 12.3 06/30/2020    A1C 11.2 02/12/2020    A1C 12.7 02/07/2019    A1C 12.5 07/20/2018

## 2021-09-05 DIAGNOSIS — E10.9 TYPE 1 DIABETES MELLITUS WITHOUT COMPLICATION (H): ICD-10-CM

## 2021-09-08 RX ORDER — INSULIN GLARGINE 100 [IU]/ML
INJECTION, SOLUTION SUBCUTANEOUS
Qty: 15 ML | Refills: 0 | Status: SHIPPED | OUTPATIENT
Start: 2021-09-08 | End: 2021-10-12

## 2021-09-08 NOTE — TELEPHONE ENCOUNTER
"Medication is being filled for 1 time refill only due to:  has upcoming appt. appointment 10/12/21    Myc message sent to patient.       Requested Prescriptions   Pending Prescriptions Disp Refills     LANTUS SOLOSTAR 100 UNIT/ML soln [Pharmacy Med Name: LANTUS SOLOSTAR PEN INJ 3ML] 15 mL 0     Sig: ADMINISTER 30 UNITS UNDER THE SKIN AT BEDTIME       Long Acting Insulin Protocol Failed - 9/5/2021  3:52 PM        Failed - Serum creatinine on file in past 12 months     Recent Labs   Lab Test 02/13/20  0647   CR 0.79       Ok to refill medication if creatinine is low          Failed - HgbA1C in past 3 or 6 months     If HgbA1C is 8 or greater, it needs to be on file within the past 3 months.  If less than 8, must be on file within the past 6 months.     Recent Labs   Lab Test 10/26/20  1246   A1C 11.3*             Failed - Recent (6 mo) or future (30 days) visit within the authorizing provider's specialty     Patient had office visit in the last 6 months or has a visit in the next 30 days with authorizing provider or within the authorizing provider's specialty.  See \"Patient Info\" tab in inbasket, or \"Choose Columns\" in Meds & Orders section of the refill encounter.            Passed - Medication is active on med list        Passed - Patient is age 18 or older             "

## 2021-10-10 ENCOUNTER — HEALTH MAINTENANCE LETTER (OUTPATIENT)
Age: 26
End: 2021-10-10

## 2021-10-11 ENCOUNTER — MYC MEDICAL ADVICE (OUTPATIENT)
Dept: ENDOCRINOLOGY | Facility: CLINIC | Age: 26
End: 2021-10-11

## 2021-10-11 ENCOUNTER — LAB (OUTPATIENT)
Dept: LAB | Facility: CLINIC | Age: 26
End: 2021-10-11
Payer: COMMERCIAL

## 2021-10-11 DIAGNOSIS — E10.9 TYPE 1 DIABETES MELLITUS WITHOUT COMPLICATION (H): ICD-10-CM

## 2021-10-11 LAB — HBA1C MFR BLD: 10.6 % (ref 0–5.6)

## 2021-10-11 PROCEDURE — 83036 HEMOGLOBIN GLYCOSYLATED A1C: CPT

## 2021-10-11 PROCEDURE — 36415 COLL VENOUS BLD VENIPUNCTURE: CPT

## 2021-10-11 NOTE — RESULT ENCOUNTER NOTE
Labs results/imaging studies results noted. Will discuss in next clinic visit 10/12/21.  Lab Results       Component                Value               Date                       A1C                      10.6                10/11/2021                 A1C                      11.3                10/26/2020              Here is a copy for your records.  Follow-up in endocrinology Clinic as scheduled/discussed. We will review these studies/results in further detail at that visit, but feel free to contact us with any other questions in the interim.    Please call endocrinology clinic (nurse line: [phone]) if questions.    Kaya Fernandez MD

## 2021-10-12 ENCOUNTER — VIRTUAL VISIT (OUTPATIENT)
Dept: ENDOCRINOLOGY | Facility: CLINIC | Age: 26
End: 2021-10-12
Payer: COMMERCIAL

## 2021-10-12 DIAGNOSIS — E10.9 TYPE 1 DIABETES MELLITUS WITHOUT COMPLICATION (H): ICD-10-CM

## 2021-10-12 PROCEDURE — 99214 OFFICE O/P EST MOD 30 MIN: CPT | Mod: GT | Performed by: INTERNAL MEDICINE

## 2021-10-12 RX ORDER — INSULIN LISPRO 100 [IU]/ML
INJECTION, SOLUTION INTRAVENOUS; SUBCUTANEOUS
Qty: 30 ML | Refills: 3 | Status: SHIPPED | OUTPATIENT
Start: 2021-10-12 | End: 2022-07-12

## 2021-10-12 NOTE — NURSING NOTE
Breakfast Lunch Supper Bedtime Overnight  Date  Pre-Meal  Glucose Insulin Carbs Note  Pre-Meal  Glucose Insulin Carbs Note  Pre-Meal  Glucose Insulin Carbs Note  Blood  Glucose Insulin Lantus Blood Glucose  10/1 84 228 200 231 35  10/2 166 275 218 115 35 83  10/3 61 251 111 135 35  10/4 212 241 79 118 35  10/5 102 189 154 220 35 70  10/6 216 119 243 81 35  10/7 139 148 176 233 35  10/8/2021 85 176 206 194 35  10/9/2021 94 129 228 137 35 62  10/10 132 121 231 200 35  10/11/2021 190 202 143 110 35

## 2021-10-12 NOTE — LETTER
"    10/12/2021         RE: Bandar Cadena  [address] [place] [id number]        Dear Colleague,    Thank you for referring your patient, Bandar Cadena, to the [facility]. Please see a copy of my visit note below.    THIS IS A VIDEO VISIT:    Phone call visit/virtual visit encounter:    Name of patient: Bandar Cadena    Date of encounter: 10/12/2021    Time of start of video visit: 9:07    Video started: 9:13    Video ended: 9:26    Provider location: working from home/ [facility]    Patient location: Patients home.    Mode of transmission: Video/ Doximity    Verbal consent: obtained before starting visit. Pt is agreeable.      The patient has been notified of following:      \"This VIDEO visit will be conducted via a call between you and your physician/provider. We have found that certain health care needs can be provided without the need for a physical exam.  This service lets us provide the care you need with a short phone conversation.  If a prescription is necessary we can send it directly to your pharmacy.  If lab work is needed we can place an order for that and you can then stop by our lab to have the test done at a later time.     With new updates with corona virus patient might be billed as clinic visit.     If during the course of the call the physician/provider feels a telephone visit is not appropriate, you will not be charged for this service.\"      Past medical history, social history, family history, allergy and medications were reviewed and updated as appropriate.  Reviewed pertinent labs, notes, imaging studies personally.    Endocrinology Clinic Note:  Name: Bandar Cadena  Seen for f/u of type 1 Diabetes.  HPI:  Bandar Cadena is a 26 year old male who presents for the evaluation/management of type 1 diabetes.   has a past medical history of Asthma and Diabetes mellitus type 1 (H).     History of noncompliance. Historically poorly controlled diabetes.   Missing " insulin doses once a day. Mostly novolog.  Sometimes low BG in the setting of miscalculation.    He has never been on insulin pump.  Has freestyle amauri at home but not using it at this time.  Was seen at [facility] [place].  No records to review.    10/1 84 228 200 231 35  10/2 166 275 218 115 35 83  10/3 61 251 111 135 35  10/4 212 241 79 118 35  10/5 102 189 154 220 35 70  10/6 216 119 243 81 35  10/7 139 148 176 233 35  10/8 85 176 206 194 35  10/9 94 129 228 137 35 62  10/10 132 121 231 200 35  10/11 190 202 143 110 35     1. Type 1 DM:  Orginally diagnosed at the age of: 9.   Current Regimen:   Lantus 35 units at night.  Novolog about 30 Units/day ( 2 units/15 gm of CHO + correctional scale 1.5 units for 50 points above 150. About 12-18 units with each meal)    Diabetes Medication(s)     Diabetic Other       glucagon 1 MG kit    Inject 1 mg into the muscle as needed for low blood sugar    Insulin       insulin lispro (HUMALOG KWIKPEN) 100 UNIT/ML (1 unit dial) KWIKPEN    INJECT 2 UNITS/15 GM CARBOHYDRATE THREE TIMES DAILY WITH MEALS PLUS CORRECTIONAL SCALE 1 UNIT/50 MG OVER 150, 2 UNITS/201-250 ETC(MAX 45 PER DAY).     LANTUS SOLOSTAR 100 UNIT/ML soln    ADMINISTER 30 UNITS UNDER THE SKIN AT BEDTIME          BS checks: few times a week. Not everyday.  BG is variable.  Average Meter Download: reviewed. See attachments on [employer].  Exercise: not much.   Last A1c: 10.6%  Symptoms of hypoglycemia (low blood sugar):   Using PUMP:      DM Complications:   Complications:   Diabetes Complications  Description / Detail    Diabetic Retinopathy  No   CAD / PAD  No   Neuropathy  No   Nephropathy / Microalbuminuria  No  Lab Results   Component Value Date    UMALCR 38.25 02/07/2019         Gastroparesis  No   Hypoglycemia [place]  No        2. Hypertension: Blood Pressure today:   BP Readings from Last 3 Encounters:   02/26/20 104/72   02/13/20 106/55   04/19/19 120/70     3. Hyperlipidemia:  On  medication    PMH/PSH:  Past Medical History:   Diagnosis Date     Asthma      Diabetes mellitus type 1 (H)      History reviewed. No pertinent surgical history.  Family Hx:  Family History   Problem Relation Age of Onset     No Known Problems Mother      No Known Problems Father            DM2: Grandpa and uncle.           Social Hx:  Social History     Socioeconomic History     Marital status:      Spouse name: Not on file     Number of children: Not on file     Years of education: Not on file     Highest education level: Not on file   Occupational History     Not on file   Tobacco Use     Smoking status: Never Smoker     Smokeless tobacco: Never Used   Substance and Sexual Activity     Alcohol use: No     Drug use: No     Sexual activity: Yes     Partners: Female   Other Topics Concern     Not on file   Social History Narrative     Not on file     Social Determinants of Health     Financial Resource Strain:      Difficulty of Paying Living Expenses:    Food Insecurity:      Worried About Running Out of Food in the Last Year:      Ran Out of Food in the Last Year:    Transportation Needs:      Lack of Transportation (Medical):      Lack of Transportation (Non-Medical):    Physical Activity:      Days of Exercise per Week:      Minutes of Exercise per Session:    Stress:      Feeling of Stress :    Social Connections:      Frequency of Communication with Friends and Family:      Frequency of Social Gatherings with Friends and Family:      Attends Mandaen Services:      Active Member of Clubs or Organizations:      Attends Club or Organization Meetings:      Marital Status:    Intimate Partner Violence:      Fear of Current or Ex-Partner:      Emotionally Abused:      Physically Abused:      Sexually Abused:           MEDICATIONS:  has a current medication list which includes the following prescription(s): albuterol, aspirin not prescribed, freestyle amauri 14 day reader, freestyle amauri 14 day sensor,  glucagon, ibuprofen, insulin lispro, insulin pen needle, lantus solostar, pravastatin, and statin not prescribed.    ROS     ROS: 10 point ROS neg other than the symptoms noted above in the HPI.    Physical Exam   VS: There were no vitals taken for this visit.  GENERAL: healthy, alert and no distress  EYES: Eyes grossly normal to inspection, conjunctivae and sclerae normal  RESP: no audible wheeze, cough, or visible cyanosis.  No visible retractions or increased work of breathing.  Able to speak fully in complete sentences.  NEURO: Cranial nerves grossly intact, mentation intact and speech normal  PSYCH: mentation appears normal, affect normal/bright, judgement and insight intact, normal speech and appearance well-groomed        LABS:  A1c:  Lab Results   Component Value Date    A1C 10.6 10/11/2021    A1C 11.3 10/26/2020    A1C 12.3 06/30/2020    A1C 11.2 02/12/2020    A1C 12.7 02/07/2019    A1C 12.5 07/20/2018       BMP:  Creatinine   Date Value Ref Range Status   02/13/2020 0.79 0.66 - 1.25 mg/dL Final       Urine Micro:  Lab Results   Component Value Date    UMALCR 6.90 06/30/2020         LFTs/Lipids:  Recent Labs   Lab Test 10/26/20  1246 06/30/20  1149   CHOL 180 191   HDL 41 43   * 131*   TRIG 89 86       TFTs:  TSH   Date Value Ref Range Status   06/30/2020 2.28 0.40 - 4.00 mU/L Final       Blood Glucose and pump data/ Meter reviewed.     All pertinent notes, labs, and images personally reviewed by me.         Glucometer/ insulin pump (if applicable)/ CGM data (if applicable) downloaded, Personally reviewed and interpreted.  All Blood sugar data reviewed personally and discussed with pt.  See nursing note from today's clinic visit for details of BG/CGM log.  All past medical, social and Family history reviewed and updated in Epic.    A/P  Mr.Ethan PAT Cadena is a 26 year old here for the evaluation/management of diabetes:    1. DM1:  ( chronic medical condition, uncontrolled requring medication dose  adjustment)  Under Poor control.  A1c 10.6%.  No known complications from diabetes.  Historically diabetes is under poor control.  Missing Humalog multiple times.  He is interested to know more about her insulin pump as well as continuous glucose monitors.  Plan:  Discussed diagnosis, pathophysiology, management and treatment options of condition with pt.  I also discussed importance of strict blood sugar control to prevent complications associated with uncontrolled diabetes.    Lantus: Decrease dose- take 32 units/day.  Novolog: Take 1 unit/ 12 gm of carbs + correctional scale 1.5 units for 50 points above 150.   Follow up with CDE for pre pump education and BG review in 4 weeks.  Recommend to start wearing freestyle amauri.  Fasting labs in 3 months.  Please make a lab appointment for blood work and follow up clinic appointment in 1 week after that to discuss results.      2. Hypertension - .Not on medication.    3. Hyperlipidemia - Under poor control.   .  On Pravastatin 40 mg /day ( started 7/1/2020).  Recheck FLP.  4. Prevention  Flu Shot-recommend annually.  Ophthalmology-recommend annually.  ASA-not indicated secondary to age.  Smoking-no.    Most Recent Immunizations   Administered Date(s) Administered     COVID-19,PF,Pfizer 04/16/2021     DTAP (<7y) 08/27/1998     Flu, Unspecified 12/12/1996     Hep B, Peds or Adolescent 04/07/1994     HepB 05/27/2021     HepB, Unspecified 04/07/1994     Historical HIB 12/19/1994     Influenza (IIV3) PF 10/30/2008     Influenza Vaccine IM > 6 months Valent IIV4 (Alfuria,Fluzone) 10/01/2020     Influenza,INJ,MDCK,PF,Quad >4yrs 10/05/2019     MMR 08/23/2000     Meningococcal (Menveo ) 09/19/2011     OPV, trivalent, live 08/27/1998     Poliovirus, inactivated (IPV) 08/27/1998     TDAP Vaccine (Boostrix) 07/30/2014     TRIHIBIT (DTAP/HIB, <7y) 08/27/1998     Td (Adult), Adsorbed 10/07/2004     Varicella 09/19/2011         Recommend checking blood sugars before meals and  at bedtime.    If Blood glucose are low more often-> 2-3 times/week- give us a call.  The patient is advised to Make better food choices: reduce carbs, Reduce portion size, weight loss and exercise 3-4 times a week.  Discussed hypoglycemia signs and symptoms as well as management in detail.      There is some variability among people, most will usually develop symptoms suggestive of hypoglycemia when blood glucose levels are lowered to the mid 60's. The first set of symptoms are called adrenergic. Patients may experience any of the following nervousness, sweating, intense hunger, trembling, weakness, palpitations, and difficulty speaking. When BS fall below 50 the patient is unable to talk and take oral therapy.  Would recommend Glucagon emergency kit for the patient and education for family and friends around the patient.   The acute management of hypoglycemia involves the rapid delivery of a source of easily absorbed sugar. Regular soda, juice, lifesavers, table sugar, are good options. 15 grams of glucose is the dose that is given, followed by an assessment of symptoms and a blood glucose check if possible. If after 10 minutes there is no improvement, another 10-15 grams should be given. This can be repeated up to three times. The equivalency of 10-15 grams of glucose (approximate servings) are: Four lifesavers, 4 teaspoons of sugar, or 1/2 cup or 4 oz of juice or regular pop.    Follow-up:  3 months    Kaya Fernandez M.D  Endocrinology  [facility]  CC: Clinic, [facility]      All questions were answered.  The patient indicates understanding of the above issues and agrees with the plan set forth.     Disclaimer: This note consists of symbols derived from keyboarding, dictation and/or voice recognition software. As a result, there may be errors in the script that have gone undetected. Please consider this when interpreting information found in this chart.    Addendum to above note and  clinic visit:    Labs reviewed.    See result note/telephone encounter.              Again, thank you for allowing me to participate in the care of your patient.        Sincerely,        Kaya Fernandez MD

## 2021-10-12 NOTE — PROGRESS NOTES
"THIS IS A VIDEO VISIT:    Phone call visit/virtual visit encounter:    Name of patient: Bandar Cadena    Date of encounter: 10/12/2021    Time of start of video visit: 9:07    Video started: 9:13    Video ended: 9:26    Provider location: working from [place]/ [facility]    Patient location: Patients home.    Mode of transmission: Video/ Doximity    Verbal consent: obtained before starting visit. Pt is agreeable.      The patient has been notified of following:      \"This VIDEO visit will be conducted via a call between you and your physician/provider. We have found that certain health care needs can be provided without the need for a physical exam.  This service lets us provide the care you need with a short phone conversation.  If a prescription is necessary we can send it directly to your pharmacy.  If lab work is needed we can place an order for that and you can then stop by our lab to have the test done at a later time.     With new updates with corona virus patient might be billed as clinic visit.     If during the course of the call the physician/provider feels a telephone visit is not appropriate, you will not be charged for this service.\"      Past medical history, social history, family history, allergy and medications were reviewed and updated as appropriate.  Reviewed pertinent labs, notes, imaging studies personally.    Endocrinology Clinic Note:  Name: Bandar Cadena  Seen for f/u of type 1 Diabetes.  HPI:  Bandar Cadena is a 26 year old male who presents for the evaluation/management of type 1 diabetes.   has a past medical history of Asthma and Diabetes mellitus type 1 (H).     History of noncompliance. Historically poorly controlled diabetes.   Missing insulin doses once a day. Mostly novolog.  Sometimes low BG in the setting of miscalculation.    He has never been on insulin pump.  Has freestyle amauri at home but not using it at this time.  Was seen at [facility]ology clinic of [place].  No " records to review.    10/1 84 228 200 231 35  10/2 166 275 218 115 35 83  10/3 61 251 111 135 35  10/4 212 241 79 118 35  10/5 102 189 154 220 35 70  10/6 216 119 243 81 35  10/7 139 148 176 233 35  10/8 85 176 206 194 35  10/9 94 129 228 137 35 62  10/10 132 121 231 200 35  10/11 190 202 143 110 35     1. Type 1 DM:  Orginally diagnosed at the age of: 9.   Current Regimen:   Lantus 35 units at night.  Novolog about 30 Units/day ( 2 units/15 gm of CHO + correctional scale 1.5 units for 50 points above 150. About 12-18 units with each meal)    Diabetes Medication(s)     Diabetic Other       glucagon 1 MG kit    Inject 1 mg into the muscle as needed for low blood sugar    Insulin       insulin lispro (HUMALOG KWIKPEN) 100 UNIT/ML (1 unit dial) KWIKPEN    INJECT 2 UNITS/15 GM CARBOHYDRATE THREE TIMES DAILY WITH MEALS PLUS CORRECTIONAL SCALE 1 UNIT/50 MG OVER 150, 2 UNITS/201-250 ETC(MAX 45 PER DAY).     LANTUS SOLOSTAR 100 UNIT/ML soln    ADMINISTER 30 UNITS UNDER THE SKIN AT BEDTIME          BS checks: few times a week. Not everyday.  BG is variable.  Average Meter Download: reviewed. See attachments on ImageTagt.  Exercise: not much.   Last A1c: 10.6%  Symptoms of hypoglycemia (low blood sugar):   Using PUMP:      DM Complications:   Complications:   Diabetes Complications  Description / Detail    Diabetic Retinopathy  No   CAD / PAD  No   Neuropathy  No   Nephropathy / Microalbuminuria  No  Lab Results   Component Value Date    UMALCR 38.25 02/07/2019         Gastroparesis  No   Hypoglycemia [place]  No        2. Hypertension: Blood Pressure today:   BP Readings from Last 3 Encounters:   02/26/20 104/72   02/13/20 106/55   04/19/19 120/70     3. Hyperlipidemia:  On medication    PMH/PSH:  Past Medical History:   Diagnosis Date     Asthma      Diabetes mellitus type 1 (H)      History reviewed. No pertinent surgical history.  Family Hx:  Family History   Problem Relation Age of Onset     No Known Problems Mother       No Known Problems Father            DM2: Grandpa and uncle.           Social Hx:  Social History     Socioeconomic History     Marital status:      Spouse name: Not on file     Number of children: Not on file     Years of education: Not on file     Highest education level: Not on file   Occupational History     Not on file   Tobacco Use     Smoking status: Never Smoker     Smokeless tobacco: Never Used   Substance and Sexual Activity     Alcohol use: No     Drug use: No     Sexual activity: Yes     Partners: Female   Other Topics Concern     Not on file   Social History Narrative     Not on file     Social Determinants of Health     Financial Resource Strain:      Difficulty of Paying Living Expenses:    Food Insecurity:      Worried About Running Out of Food in the Last Year:      Ran Out of Food in the Last Year:    Transportation Needs:      Lack of Transportation (Medical):      Lack of Transportation (Non-Medical):    Physical Activity:      Days of Exercise per Week:      Minutes of Exercise per Session:    Stress:      Feeling of Stress :    Social Connections:      Frequency of Communication with Friends and Family:      Frequency of Social Gatherings with Friends and Family:      Attends L[employer] Services:      Active Member of Clubs or Organizations:      Attends Club or Organization Meetings:      Marital Status:    Intimate Partner Violence:      Fear of Current or Ex-Partner:      Emotionally Abused:      Physically Abused:      Sexually Abused:           MEDICATIONS:  has a current medication list which includes the following prescription(s): albuterol, aspirin not prescribed, freestyle amauri 14 day reader, freestyle amauri 14 day sensor, glucagon, ibuprofen, insulin lispro, insulin pen needle, lantus solostar, pravastatin, and statin not prescribed.    ROS     ROS: 10 point ROS neg other than the symptoms noted above in the HPI.    Physical Exam   VS: There were no vitals taken for this  visit.  GENERAL: healthy, alert and no distress  EYES: Eyes grossly normal to inspection, conjunctivae and sclerae normal  RESP: no audible wheeze, cough, or visible cyanosis.  No visible retractions or increased work of breathing.  Able to speak fully in complete sentences.  NEURO: Cranial nerves grossly intact, mentation intact and speech normal  PSYCH: mentation appears normal, affect normal/bright, judgement and insight intact, normal speech and appearance well-groomed        LABS:  A1c:  Lab Results   Component Value Date    A1C 10.6 10/11/2021    A1C 11.3 10/26/2020    A1C 12.3 06/30/2020    A1C 11.2 02/12/2020    A1C 12.7 02/07/2019    A1C 12.5 07/20/2018       BMP:  Creatinine   Date Value Ref Range Status   02/13/2020 0.79 0.66 - 1.25 mg/dL Final       Urine Micro:  Lab Results   Component Value Date    UMALCR 6.90 06/30/2020         LFTs/Lipids:  Recent Labs   Lab Test 10/26/20  1246 06/30/20  1149   CHOL 180 191   HDL 41 43   * 131*   TRIG 89 86       TFTs:  TSH   Date Value Ref Range Status   06/30/2020 2.28 0.40 - 4.00 mU/L Final       Blood Glucose and pump data/ Meter reviewed.     All pertinent notes, labs, and images personally reviewed by me.         Glucometer/ insulin pump (if applicable)/ CGM data (if applicable) downloaded, Personally reviewed and interpreted.  All Blood sugar data reviewed personally and discussed with pt.  See nursing note from today's clinic visit for details of BG/CGM log.  All past medical, social and Family history reviewed and updated in Epic.    A/P  Mr.Ethan PAT Cadena is a 26 year old here for the evaluation/management of diabetes:    1. DM1:  ( chronic medical condition, uncontrolled requring medication dose adjustment)  Under Poor control.  A1c 10.6%.  No known complications from diabetes.  Historically diabetes is under poor control.  Missing Humalog multiple times.  He is interested to know more about her insulin pump as well as continuous glucose  monitors.  Plan:  Discussed diagnosis, pathophysiology, management and treatment options of condition with pt.  I also discussed importance of strict blood sugar control to prevent complications associated with uncontrolled diabetes.    Lantus: Decrease dose- take 32 units/day.  Novolog: Take 1 unit/ 12 gm of carbs + correctional scale 1.5 units for 50 points above 150.   Follow up with CDE for pre pump education and BG review in 4 weeks.  Recommend to start wearing freestyle amauri.  Fasting labs in 3 months.  Please make a lab appointment for blood work and follow up clinic appointment in 1 week after that to discuss results.      2. Hypertension - .Not on medication.    3. Hyperlipidemia - Under poor control.   .  On Pravastatin 40 mg /day ( started 7/1/2020).  Recheck FLP.  4. Prevention  Flu Shot-recommend annually.  Ophthalmology-recommend annually.  ASA-not indicated secondary to age.  Smoking-no.    Most Recent Immunizations   Administered Date(s) Administered     COVID-19,PF,Pfizer 04/16/2021     DTAP (<7y) 08/27/1998     Flu, Unspecified 12/12/1996     Hep B, Peds or Adolescent 04/07/1994     HepB 05/27/2021     HepB, Unspecified 04/07/1994     Historical HIB 12/19/1994     Influenza (IIV3) PF 10/30/2008     Influenza Vaccine IM > 6 months Valent IIV4 (Alfuria,Fluzone) 10/01/2020     Influenza,INJ,MDCK,PF,Quad >4yrs 10/05/2019     MMR 08/23/2000     Meningococcal (Menveo ) 09/19/2011     OPV, trivalent, live 08/27/1998     Poliovirus, inactivated (IPV) 08/27/1998     TDAP Vaccine (Boostrix) 07/30/2014     TRIHIBIT (DTAP/HIB, <7y) 08/27/1998     Td (Adult), Adsorbed 10/07/2004     Varicella 09/19/2011         Recommend checking blood sugars before meals and at bedtime.    If Blood glucose are low more often-> 2-3 times/week- give us a call.  The patient is advised to Make better food choices: reduce carbs, Reduce portion size, weight loss and exercise 3-4 times a week.  Discussed hypoglycemia signs  and symptoms as well as management in detail.      There is some variability among people, most will usually develop symptoms suggestive of hypoglycemia when blood glucose levels are lowered to the mid 60's. The first set of symptoms are called adrenergic. Patients may experience any of the following nervousness, sweating, intense hunger, trembling, weakness, palpitations, and difficulty speaking. When BS fall below 50 the patient is unable to talk and take oral therapy.  Would recommend Glucagon emergency kit for the patient and education for family and friends around the patient.   The acute management of hypoglycemia involves the rapid delivery of a source of easily absorbed sugar. Regular soda, juice, lifesavers, table sugar, are good options. 15 grams of glucose is the dose that is given, followed by an assessment of symptoms and a blood glucose check if possible. If after 10 minutes there is no improvement, another 10-15 grams should be given. This can be repeated up to three times. The equivalency of 10-15 grams of glucose (approximate servings) are: Four lifesavers, 4 teaspoons of sugar, or 1/2 cup or 4 oz of juice or regular pop.    Follow-up:  3 months    Kaya Fernandez M.D  Endocrinology  [facility]  CC: Clinic, [facility]      All questions were answered.  The patient indicates understanding of the above issues and agrees with the plan set forth.     Disclaimer: This note consists of symbols derived from keyboarding, dictation and/or voice recognition software. As a result, there may be errors in the script that have gone undetected. Please consider this when interpreting information found in this chart.    Addendum to above note and clinic visit:    Labs reviewed.    See result note/telephone encounter.

## 2021-10-12 NOTE — PATIENT INSTRUCTIONS
[facility]  Dr Fernandez, Endocrinology Department    [facility]   [address] [facility]  [place] [address]  Appointment [phone]  Fax: [phone]  [place]: Monday - Thursday      To provide the best diabetic care, please bring your blood glucose meter to each and every visit with your Endocrinologist.  Your blood glucose meter/insulin pump will be downloaded at every appointment.    Please arrive 15 minutes before your scheduled appointment.  This will allow for your blood glucose meter/insulin pump to be downloaded.  If you are wearing DEXCOM please bring  or sharing code so that it can be downloaded.  If you are using freestyle amauri personal sensors please bring the reader.  If you are using TANDEM insulin pump please have your username and password to get info from [employer] website.  Lantus: Decrease dose- take 32 units/day.  Novolog: Take 1 unit/ 12 gm of carbs + correctional scale 1.5 units for 50 points above 150.   Follow up with CDE for pre pump education and BG review in 4 weeks.  Recommend to start wearing freestyle amauri.  Fasting labs in 3 months.  Please make a lab appointment for blood work and follow up clinic appointment in 1 week after that to discuss results.      Recommend checking blood sugars before meals and at bedtime.    If Blood glucose are low more often-> 2-3 times/week- give us a call.  The patient is advised to Make better food choices: reduce carbs, Reduce portion size, weight loss and exercise 3-4 times a week.  Discussed hypoglycemia signs and symptoms as well as management in detail.

## 2021-10-13 ENCOUNTER — TELEPHONE (OUTPATIENT)
Dept: ENDOCRINOLOGY | Facility: CLINIC | Age: 26
End: 2021-10-13
Payer: COMMERCIAL

## 2021-10-13 NOTE — TELEPHONE ENCOUNTER
Diabetes Education Scheduling Outreach #1:    Call to patient to schedule. Left message with phone number to call to schedule.    Plan for 2nd outreach attempt within 1 week.    Morenita Cerrato  [place] [facility]  Diabetes and Nutrition Scheduling

## 2021-10-18 ENCOUNTER — TELEPHONE (OUTPATIENT)
Dept: ENDOCRINOLOGY | Facility: CLINIC | Age: 26
End: 2021-10-18
Payer: COMMERCIAL

## 2021-10-18 NOTE — TELEPHONE ENCOUNTER
Diabetes Education Scheduling Outreach #2:    Call to patient to schedule. Left message with phone number to call to schedule.      Morenita Cerrato  [place] [facility]  Diabetes and Nutrition Scheduling

## 2021-12-12 DIAGNOSIS — E10.9 TYPE 1 DIABETES MELLITUS WITHOUT COMPLICATION (H): ICD-10-CM

## 2021-12-12 RX ORDER — INSULIN GLARGINE 100 [IU]/ML
INJECTION, SOLUTION SUBCUTANEOUS
Qty: 15 ML | Refills: 0 | Status: CANCELLED | OUTPATIENT
Start: 2021-12-12

## 2021-12-13 RX ORDER — INSULIN GLARGINE 100 [IU]/ML
INJECTION, SOLUTION SUBCUTANEOUS
Qty: 30 ML | Refills: 0 | Status: SHIPPED | OUTPATIENT
Start: 2021-12-13 | End: 2022-03-21

## 2022-01-03 ENCOUNTER — TELEPHONE (OUTPATIENT)
Dept: ENDOCRINOLOGY | Facility: CLINIC | Age: 27
End: 2022-01-03
Payer: COMMERCIAL

## 2022-01-03 DIAGNOSIS — E10.9 TYPE 1 DIABETES MELLITUS WITHOUT COMPLICATION (H): ICD-10-CM

## 2022-01-03 NOTE — TELEPHONE ENCOUNTER
Routing refill request to provider for review/approval because:  Drug not on the [facility] refill protocol     Pending Prescriptions:                       Disp   Refills    Continuous Blood Gluc  (FREESTYLE *                Si each daily For amauri personal continuous glucose           monitor system. Sensors are changed every 14           days( 2 sensors--> 1 month). 1 reader.

## 2022-01-06 RX ORDER — FLASH GLUCOSE SCANNING READER
1 EACH MISCELLANEOUS DAILY
Qty: 1 EACH | Refills: 0 | Status: SHIPPED | OUTPATIENT
Start: 2022-01-06

## 2022-01-29 ENCOUNTER — HEALTH MAINTENANCE LETTER (OUTPATIENT)
Age: 27
End: 2022-01-29

## 2022-02-17 PROBLEM — E11.10 DKA (DIABETIC KETOACIDOSIS) (H): Status: ACTIVE | Noted: 2018-07-20

## 2022-03-22 ENCOUNTER — TELEPHONE (OUTPATIENT)
Dept: ENDOCRINOLOGY | Facility: CLINIC | Age: 27
End: 2022-03-22
Payer: COMMERCIAL

## 2022-03-22 NOTE — TELEPHONE ENCOUNTER
Prior Authorization Retail Medication Request    Medication/Dose: HUMALOG 100U/ML KWIKPEN 3 ML  ICD code (if different than what is on RX):    Previously Tried and Failed:    Rationale:      Insurance Name:    Insurance ID:  [id number]       Pharmacy Information (if different than what is on RX)  Name:  [facility]  Phone:  [phone]

## 2022-03-24 NOTE — TELEPHONE ENCOUNTER
Central Prior Authorization Team   Phone: [phone]      PA Initiation    Medication: HUMALOG-Initiated  Insurance Company: [employer] - Phone [phone] Fax [phone]  Pharmacy Filling the Rx: [id number] - [place], [place] - [address] AT [facility] [address]  Filling Pharmacy Phone: [phone]  Filling Pharmacy Fax:    Start Date: 3/24/2022

## 2022-03-28 NOTE — TELEPHONE ENCOUNTER
Called [employer] to check on status of PA, they state that the patient's plan reviews PA's.  Transferred to [phone].  There is not an option to speak with a representative or find out the status of the PA.  Filled out new PA form and manually faxed to Clementine [phone], marked as urgent.

## 2022-03-29 NOTE — TELEPHONE ENCOUNTER
PRIOR AUTHORIZATION DENIED    Medication: HUMALOG-DENIED    Denial Date: 3/29/2022    Denial Rational: Insurance will cover if patient has a documented allergy to Novolog, Novolin        Appeal Information:

## 2022-05-21 ENCOUNTER — HEALTH MAINTENANCE LETTER (OUTPATIENT)
Age: 27
End: 2022-05-21

## 2022-06-19 DIAGNOSIS — E10.9 TYPE 1 DIABETES MELLITUS WITHOUT COMPLICATION (H): ICD-10-CM

## 2022-06-20 RX ORDER — INSULIN GLARGINE 100 [IU]/ML
INJECTION, SOLUTION SUBCUTANEOUS
Qty: 30 ML | Refills: 0 | Status: SHIPPED | OUTPATIENT
Start: 2022-06-20 | End: 2022-07-12

## 2022-06-30 ENCOUNTER — TELEPHONE (OUTPATIENT)
Dept: ENDOCRINOLOGY | Facility: CLINIC | Age: 27
End: 2022-06-30

## 2022-06-30 NOTE — TELEPHONE ENCOUNTER
Prior Authorization Approval    Authorization Effective Date: 6/30/2022  Authorization Expiration Date: 6/30/2023  Medication: Freestyle Marilyn 14 Day Sensors: PA Approved  Approved Dose/Quantity: 2 sensors for 28 days  Reference #: [id number]   Insurance Company: [employer] - Phone [phone] Fax [phone]  Expected CoPay:       CoPay Card Available:      Foundation Assistance Needed:    Which Pharmacy is filling the prescription (Not needed for infusion/clinic administered):    Pharmacy Notified:    Patient Notified:

## 2022-06-30 NOTE — TELEPHONE ENCOUNTER
PA Initiation    Medication: Freestyle Marilyn 14 Day Sensors: PA Pending  Insurance Company: [employer] - Phone [phone] Fax [phone]  Pharmacy Filling the Rx:    Filling Pharmacy Phone:    Filling Pharmacy Fax:    Start Date: 6/30/2022    [id number]

## 2022-07-06 ENCOUNTER — MYC MEDICAL ADVICE (OUTPATIENT)
Dept: ENDOCRINOLOGY | Facility: CLINIC | Age: 27
End: 2022-07-06

## 2022-07-06 NOTE — PROGRESS NOTES
Outcome for 07/06/22 8:03 AM: [employer] message sent  NIKKI Escalante  Outcome for 07/11/22 11:10 AM: Marilyn emailed to provider. Patient sent marilyn data via tapviva.   NIKKI Escalante

## 2022-07-12 ENCOUNTER — VIRTUAL VISIT (OUTPATIENT)
Dept: ENDOCRINOLOGY | Facility: CLINIC | Age: 27
End: 2022-07-12
Payer: COMMERCIAL

## 2022-07-12 ENCOUNTER — TELEPHONE (OUTPATIENT)
Dept: ENDOCRINOLOGY | Facility: CLINIC | Age: 27
End: 2022-07-12

## 2022-07-12 DIAGNOSIS — E10.65 TYPE 1 DIABETES MELLITUS WITH HYPERGLYCEMIA (H): Primary | ICD-10-CM

## 2022-07-12 DIAGNOSIS — E10.9 TYPE 1 DIABETES MELLITUS WITHOUT COMPLICATION (H): ICD-10-CM

## 2022-07-12 PROCEDURE — 99214 OFFICE O/P EST MOD 30 MIN: CPT | Mod: 95 | Performed by: INTERNAL MEDICINE

## 2022-07-12 PROCEDURE — 95251 CONT GLUC MNTR ANALYSIS I&R: CPT | Performed by: INTERNAL MEDICINE

## 2022-07-12 RX ORDER — INSULIN GLARGINE 100 [IU]/ML
38 INJECTION, SOLUTION SUBCUTANEOUS AT BEDTIME
Qty: 30 ML | Refills: 3 | Status: SHIPPED | OUTPATIENT
Start: 2022-07-12

## 2022-07-12 RX ORDER — FLASH GLUCOSE SENSOR
1 KIT MISCELLANEOUS DAILY
Qty: 6 EACH | Refills: 3 | Status: SHIPPED | OUTPATIENT
Start: 2022-07-12

## 2022-07-12 NOTE — PROGRESS NOTES
"THIS IS A VIDEO VISIT:    Phone call visit/virtual visit encounter:    Name of patient: Bandar Cadena    Date of encounter: 7/12/2022    Time of start of video visit: 9:30    Video started: 9:35    Video ended: 9:50    Provider location: working from [place]/ [facility]    Patient location: Patients home.    Mode of transmission: Video/ Doximity    Verbal consent: obtained before starting visit. Pt is agreeable.      The patient has been notified of following:      \"This VIDEO visit will be conducted via a call between you and your physician/provider. We have found that certain health care needs can be provided without the need for a physical exam.  This service lets us provide the care you need with a short phone conversation.  If a prescription is necessary we can send it directly to your pharmacy.  If lab work is needed we can place an order for that and you can then stop by our lab to have the test done at a later time.     With new updates with corona virus patient might be billed as clinic visit.     If during the course of the call the physician/provider feels a telephone visit is not appropriate, you will not be charged for this service.\"      Past medical history, social history, family history, allergy and medications were reviewed and updated as appropriate.  Reviewed pertinent labs, notes, imaging studies personally.    Endocrinology Clinic Note:  Name: Bandar Cadena  Seen for f/u of type 1 Diabetes.  HPI:  Bandar Cadena is a 26 year old male who presents for the evaluation/management of type 1 diabetes.   has a past medical history of Asthma and Diabetes mellitus type 1 (H).     History of noncompliance. Historically poorly controlled diabetes.   Missing Novolog many times.  Sometimes low BG in the setting of miscalculation.    He has never been on insulin pump.  Was seen at [facility] of [place] prior.  No records to review.  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  Dinner is at 10:00 PM.         1. Type 1 DM:  Orginally diagnosed at the age of: 9.   Current Regimen:   Lantus 35 units at night.  Novolog about 60 Units/day ( 2.5 units/15 gm of CHO + correctional scale 3 units for 50 points above 150. About 15-20 units with each meal)    Diabetes Medication(s)     Diabetic Other       glucagon 1 MG kit    Inject 1 mg into the muscle as needed for low blood sugar    Insulin       insulin aspart (NOVOLOG PEN) 100 UNIT/ML pen    Take 1 unit/ 12 gm of carbs + correctional scale 1.5 units for 50 points above 150. About 50 units/day,     LANTUS SOLOSTAR 100 UNIT/ML soln    ADMINISTER 32 UNITS UNDER THE SKIN AT BEDTIME     insulin lispro (HUMALOG KWIKPEN) 100 UNIT/ML (1 unit dial) KWIKPEN    Take 1 unit/ 12 gm of carbs + correctional scale 1.5 units for 50 points above 150. About 50 units/day     Patient not taking: Reported on 7/12/2022          BS checks: few times a week. Not everyday.  BG is variable.  Average Meter Download: reviewed. See attachments on [id number].  Exercise: not much.   Last A1c: 10.6%  Symptoms of hypoglycemia (low blood sugar):   Using PUMP:      DM Complications:   Complications:   Diabetes Complications  Description / Detail    Diabetic Retinopathy  No   CAD / PAD  No   Neuropathy  No   Nephropathy / Microalbuminuria  No  Lab Results   Component Value Date    UMALCR 38.25 02/07/2019         Gastroparesis  No   Hypoglycemia [place]  No        2. Hypertension: Blood Pressure today:   BP Readings from Last 3 Encounters:   02/26/20 104/72   02/13/20 106/55   04/19/19 120/70     3. Hyperlipidemia:  On medication    PMH/PSH:  Past Medical History:   Diagnosis Date     Asthma      Diabetes mellitus type 1 (H)      No past surgical history on file.  Family Hx:  Family History   Problem Relation Age of Onset     No Known Problems Mother      No Known Problems Father            DM2: Grandpa and uncle.           Social Hx:  Social History      Socioeconomic History     Marital status:      Spouse name: Not on file     Number of children: Not on file     Years of education: Not on file     Highest education level: Not on file   Occupational History     Not on file   Tobacco Use     Smoking status: Never Smoker     Smokeless tobacco: Never Used   Substance and Sexual Activity     Alcohol use: No     Drug use: No     Sexual activity: Yes     Partners: Female   Other Topics Concern     Not on file   Social History Narrative     Not on file     Social Determinants of Health     Financial Resource Strain: Not on file   Food Insecurity: Not on file   Transportation Needs: Not on file   Physical Activity: Not on file   Stress: Not on file   Social Connections: Not on file   Intimate Partner Violence: Not on file   Housing Stability: Not on file          MEDICATIONS:  has a current medication list which includes the following prescription(s): albuterol, aspirin not prescribed, bd pen needle beatriz 2nd gen, freestyle amauri 14 day reader, freestyle amauri 14 day sensor, glucagon, ibuprofen, insulin aspart, lantus solostar, pravastatin, insulin lispro, and statin not prescribed.    ROS     ROS: 10 point ROS neg other than the symptoms noted above in the HPI.    Physical Exam   VS: There were no vitals taken for this visit.  GENERAL: healthy, alert and no distress  EYES: Eyes grossly normal to inspection, conjunctivae and sclerae normal  ENT: no nose swelling, nasal discharge.  Thyroid: no apparent thyroid nodules  RESP: no audible wheeze, cough, or visible cyanosis.  No visible retractions or increased work of breathing.  Able to speak fully in complete sentences.  ABDO: not evaluated.  EXTREMITIES: no hand tremors.  NEURO: Cranial nerves grossly intact, mentation intact and speech normal  SKIN: No apparent skin lesions, rash or edema seen   PSYCH: mentation appears normal, affect normal/bright, judgement and insight intact, normal speech and appearance  well-groomed    LABS:  A1c:  Lab Results   Component Value Date    A1C 10.6 10/11/2021    A1C 11.3 10/26/2020    A1C 12.3 06/30/2020    A1C 11.2 02/12/2020    A1C 12.7 02/07/2019    A1C 12.5 07/20/2018     BMP:  Creatinine   Date Value Ref Range Status   02/13/2020 0.79 0.66 - 1.25 mg/dL Final     Urine Micro:  Lab Results   Component Value Date    UMALCR 6.90 06/30/2020          LFTs/Lipids:  Recent Labs   Lab Test 10/26/20  1246 06/30/20  1149   CHOL 180 191   HDL 41 43   * 131*   TRIG 89 86       TFTs:  TSH   Date Value Ref Range Status   06/30/2020 2.28 0.40 - 4.00 mU/L Final       Blood Glucose and pump data/ Meter reviewed.     All pertinent notes, labs, and images personally reviewed by me.         Glucometer/ insulin pump (if applicable)/ CGM data (if applicable) downloaded, Personally reviewed and interpreted.  All Blood sugar data reviewed personally and discussed with pt.  See nursing note from today's clinic visit for details of BG/CGM log.  All past medical, social and Family history reviewed and updated in [facility].    A/P  Mr.Ethan PAT Cadena is a 26 year old here for the evaluation/management of diabetes:    1. DM1:  ( chronic medical condition, uncontrolled requring medication dose adjustment)  Under Poor control.  A1c 10.6%.  No known complications from diabetes.  Historically diabetes is under poor control.  Missing Humalog multiple times.  He is interested to know more about her insulin pump as well as continuous glucose monitors.  Plan:  Discussed diagnosis, pathophysiology, management and treatment options of condition with pt.  I also discussed importance of strict blood sugar control to prevent complications associated with uncontrolled diabetes.    Fasting labs needed.  Please make lab appointment.  Continue to use amauri- scan often.  Increase Lantus to 38 units/day  Continue NovoLog at current dose except decrease correctional scale--take 2 units for 50 points above 150.  Continue current  carb counting.-- new scale will be- take 2.5 units/15 gm of CHO + correctional scale 2 units for 50 points above 150.  Try to be consistent with NovoLog use with each meal.  Follow-up with diabetic educator for education regarding insulin pump-OmniPod.  Please check cost of insulin pump with insurance.  Repeat labs in 3 months.  Follow-up in 3 months.  Please make a lab appointment for blood work and follow up clinic appointment in 1 week after that to discuss results.    2. Hypertension - .Not on medication.    3. Hyperlipidemia - Under poor control.   .  On Pravastatin 40 mg /day ( started 7/1/2020).  Recheck FLP.  4. Prevention  Flu Shot-recommend annually.  Ophthalmology-recommend annually. Last visit was 1 year back.  ASA-not indicated secondary to age.  Smoking-no.    Most Recent Immunizations   Administered Date(s) Administered     DTAP (<7y) 08/27/1998     DTP-Hib 12/19/1994     Flu, Unspecified 12/12/1996     Hep B, Peds or Adolescent 04/07/1994     HepB 05/27/2021     HepB, Unspecified 04/07/1994     Influenza (IIV3) PF 10/30/2008     Influenza Vaccine IM > 6 months Valent IIV4 (Alfuria,Fluzone) 10/01/2020     Influenza,INJ,MDCK,PF,Quad >4yrs 10/05/2019     MMR 08/23/2000     Meningococcal (Menveo ) 09/19/2011     OPV, trivalent, live 08/27/1998     Poliovirus, inactivated (IPV) 08/27/1998     TDAP Vaccine (Boostrix) 07/30/2014     TRIHIBIT (DTAP/HIB, <7y) 08/27/1998     Td (Adult), Adsorbed 10/07/2004     Varicella 09/19/2011         Recommend checking blood sugars before meals and at bedtime.    If Blood glucose are low more often-> 2-3 times/week- give us a call.  The patient is advised to Make better food choices: reduce carbs, Reduce portion size, weight loss and exercise 3-4 times a week.  Discussed hypoglycemia signs and symptoms as well as management in detail.      There is some variability among people, most will usually develop symptoms suggestive of hypoglycemia when blood glucose levels  are lowered to the mid 60's. The first set of symptoms are called adrenergic. Patients may experience any of the following nervousness, sweating, intense hunger, trembling, weakness, palpitations, and difficulty speaking. When BS fall below 50 the patient is unable to talk and take oral therapy.  Would recommend Glucagon emergency kit for the patient and education for family and friends around the patient.   The acute management of hypoglycemia involves the rapid delivery of a source of easily absorbed sugar. Regular soda, juice, lifesavers, table sugar, are good options. 15 grams of glucose is the dose that is given, followed by an assessment of symptoms and a blood glucose check if possible. If after 10 minutes there is no improvement, another 10-15 grams should be given. This can be repeated up to three times. The equivalency of 10-15 grams of glucose (approximate servings) are: Four lifesavers, 4 teaspoons of sugar, or 1/2 cup or 4 oz of juice or regular pop.    Follow-up:  3 months    Kaya Fernandez M.D  Endocrinology  [facility]/[place]  CC: Clinic, [facility]      All questions were answered.  The patient indicates understanding of the above issues and agrees with the plan set forth.     Disclaimer: This note consists of symbols derived from keyboarding, dictation and/or voice recognition software. As a result, there may be errors in the script that have gone undetected. Please consider this when interpreting information found in this chart.    Addendum to above note and clinic visit:    Labs reviewed.    See result note/telephone encounter.              Answers for HPI/ROS submitted by the patient on 7/11/2022  General Symptoms: No  Skin Symptoms: No  HENT Symptoms: No  EYE SYMPTOMS: No  HEART SYMPTOMS: No  LUNG SYMPTOMS: No  INTESTINAL SYMPTOMS: No  URINARY SYMPTOMS: No  REPRODUCTIVE SYMPTOMS: No  SKELETAL SYMPTOMS: No  BLOOD SYMPTOMS: No  NERVOUS SYSTEM SYMPTOMS: No  MENTAL HEALTH  SYMPTOMS: No

## 2022-07-12 NOTE — LETTER
"    7/12/2022         RE: Bandar Cadena  [address] [place] [id number]        Dear Colleague,    Thank you for referring your patient, Bandar Cadena, to the [facility]. Please see a copy of my visit note below.    Outcome for 07/06/22 8:03 AM: [employer] message sent  NIKKI Escalante  Outcome for 07/11/22 11:10 AM: Marilyn emailed to provider. Patient sent [employer] data via [employer].   Holly RichardNIKKI is a 26 year old who is being evaluated via a billable video visit.        How would you like to obtain your AVS? Naubo  If the video visit is dropped, the invitation should be resent by: Text to cell phone: [phone]  Will anyone else be joining your video visit? No            THIS IS A VIDEO VISIT:    Phone call visit/virtual visit encounter:    Name of patient: Bandar Cadena    Date of encounter: 7/12/2022    Time of start of video visit: 9:30    Video started: 9:35    Video ended: 9:50    Provider location: working from home/ [facility]    Patient location: Patients home.    Mode of transmission: Video/ Doximity    Verbal consent: obtained before starting visit. Pt is agreeable.      The patient has been notified of following:      \"This VIDEO visit will be conducted via a call between you and your physician/provider. We have found that certain health care needs can be provided without the need for a physical exam.  This service lets us provide the care you need with a short phone conversation.  If a prescription is necessary we can send it directly to your pharmacy.  If lab work is needed we can place an order for that and you can then stop by our lab to have the test done at a later time.     With new updates with corona virus patient might be billed as clinic visit.     If during the course of the call the physician/provider feels a telephone visit is not appropriate, you will not be charged for this service.\"      Past medical history, social history, " family history, allergy and medications were reviewed and updated as appropriate.  Reviewed pertinent labs, notes, imaging studies personally.    Endocrinology Clinic Note:  Name: Bandar Cadena  Seen for f/u of type 1 Diabetes.  HPI:  Bandar Cadena is a 26 year old male who presents for the evaluation/management of type 1 diabetes.   has a past medical history of Asthma and Diabetes mellitus type 1 (H).     History of noncompliance. Historically poorly controlled diabetes.   Missing Novolog many times.  Sometimes low BG in the setting of miscalculation.    He has never been on insulin pump.  Was seen at [facility] [place] prior.  No records to review.  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  Dinner is at 10:00 PM.         1. Type 1 DM:  Orginally diagnosed at the age of: 9.   Current Regimen:   Lantus 35 units at night.  Novolog about 60 Units/day ( 2.5 units/15 gm of CHO + correctional scale 3 units for 50 points above 150. About 15-20 units with each meal)    Diabetes Medication(s)     Diabetic Other       glucagon 1 MG kit    Inject 1 mg into the muscle as needed for low blood sugar    Insulin       insulin aspart (NOVOLOG PEN) 100 UNIT/ML pen    Take 1 unit/ 12 gm of carbs + correctional scale 1.5 units for 50 points above 150. About 50 units/day,     LANTUS SOLOSTAR 100 UNIT/ML soln    ADMINISTER 32 UNITS UNDER THE SKIN AT BEDTIME     insulin lispro (HUMALOG KWIKPEN) 100 UNIT/ML (1 unit dial) KWIKPEN    Take 1 unit/ 12 gm of carbs + correctional scale 1.5 units for 50 points above 150. About 50 units/day     Patient not taking: Reported on 7/12/2022          BS checks: few times a week. Not everyday.  BG is variable.  Average Meter Download: reviewed. See attachments on [employer].  Exercise: not much.   Last A1c: 10.6%  Symptoms of hypoglycemia (low blood sugar):   Using PUMP:      DM Complications:   Complications:   Diabetes Complications  Description /  Detail    Diabetic Retinopathy  No   CAD / PAD  No   Neuropathy  No   Nephropathy / Microalbuminuria  No  Lab Results   Component Value Date    UMALCR 38.25 02/07/2019         Gastroparesis  No   Hypoglycemia [place]  No        2. Hypertension: Blood Pressure today:   BP Readings from Last 3 Encounters:   02/26/20 104/72   02/13/20 106/55   04/19/19 120/70     3. Hyperlipidemia:  On medication    PMH/PSH:  Past Medical History:   Diagnosis Date     Asthma      Diabetes mellitus type 1 (H)      No past surgical history on file.  Family Hx:  Family History   Problem Relation Age of Onset     No Known Problems Mother      No Known Problems Father            DM2: Grandpa and uncle.           Social Hx:  Social History     Socioeconomic History     Marital status:      Spouse name: Not on file     Number of children: Not on file     Years of education: Not on file     Highest education level: Not on file   Occupational History     Not on file   Tobacco Use     Smoking status: Never Smoker     Smokeless tobacco: Never Used   Substance and Sexual Activity     Alcohol use: No     Drug use: No     Sexual activity: Yes     Partners: Female   Other Topics Concern     Not on file   Social History Narrative     Not on file     Social Determinants of Health     Financial Resource Strain: Not on file   Food Insecurity: Not on file   Transportation Needs: Not on file   Physical Activity: Not on file   Stress: Not on file   Social Connections: Not on file   Intimate Partner Violence: Not on file   Housing Stability: Not on file          MEDICATIONS:  has a current medication list which includes the following prescription(s): albuterol, aspirin not prescribed, bd pen needle beatriz 2nd gen, freestyle amauri 14 day reader, freestyle amauri 14 day sensor, glucagon, ibuprofen, insulin aspart, lantus solostar, pravastatin, insulin lispro, and statin not prescribed.    ROS     ROS: 10 point ROS neg other than the symptoms noted above  in the HPI.    Physical Exam   VS: There were no vitals taken for this visit.  GENERAL: healthy, alert and no distress  EYES: Eyes grossly normal to inspection, conjunctivae and sclerae normal  ENT: no nose swelling, nasal discharge.  Thyroid: no apparent thyroid nodules  RESP: no audible wheeze, cough, or visible cyanosis.  No visible retractions or increased work of breathing.  Able to speak fully in complete sentences.  ABDO: not evaluated.  EXTREMITIES: no hand tremors.  NEURO: Cranial nerves grossly intact, mentation intact and speech normal  SKIN: No apparent skin lesions, rash or edema seen   PSYCH: mentation appears normal, affect normal/bright, judgement and insight intact, normal speech and appearance well-groomed    LABS:  A1c:  Lab Results   Component Value Date    A1C 10.6 10/11/2021    A1C 11.3 10/26/2020    A1C 12.3 06/30/2020    A1C 11.2 02/12/2020    A1C 12.7 02/07/2019    A1C 12.5 07/20/2018     BMP:  Creatinine   Date Value Ref Range Status   02/13/2020 0.79 0.66 - 1.25 mg/dL Final     Urine Micro:  Lab Results   Component Value Date    UMALCR 6.90 06/30/2020          LFTs/Lipids:  Recent Labs   Lab Test 10/26/20  1246 06/30/20  1149   CHOL 180 191   HDL 41 43   * 131*   TRIG 89 86       TFTs:  TSH   Date Value Ref Range Status   06/30/2020 2.28 0.40 - 4.00 mU/L Final       Blood Glucose and pump data/ Meter reviewed.     All pertinent notes, labs, and images personally reviewed by me.         Glucometer/ insulin pump (if applicable)/ CGM data (if applicable) downloaded, Personally reviewed and interpreted.  All Blood sugar data reviewed personally and discussed with pt.  See nursing note from today's clinic visit for details of BG/CGM log.  All past medical, social and Family history reviewed and updated in Epic.    A/P  Mr.Ethan PAT Cadena is a 26 year old here for the evaluation/management of diabetes:    1. DM1:  ( chronic medical condition, uncontrolled requring medication dose  adjustment)  Under Poor control.  A1c 10.6%.  No known complications from diabetes.  Historically diabetes is under poor control.  Missing Humalog multiple times.  He is interested to know more about her insulin pump as well as continuous glucose monitors.  Plan:  Discussed diagnosis, pathophysiology, management and treatment options of condition with pt.  I also discussed importance of strict blood sugar control to prevent complications associated with uncontrolled diabetes.    Fasting labs needed.  Please make lab appointment.  Continue to use amauri- scan often.  Increase Lantus to 38 units/day  Continue NovoLog at current dose except decrease correctional scale--take 2 units for 50 points above 150.  Continue current carb counting.-- new scale will be- take 2.5 units/15 gm of CHO + correctional scale 2 units for 50 points above 150.  Try to be consistent with NovoLog use with each meal.  Follow-up with diabetic educator for education regarding insulin pump-OmniPod.  Please check cost of insulin pump with insurance.  Repeat labs in 3 months.  Follow-up in 3 months.  Please make a lab appointment for blood work and follow up clinic appointment in 1 week after that to discuss results.    2. Hypertension - .Not on medication.    3. Hyperlipidemia - Under poor control.   .  On Pravastatin 40 mg /day ( started 7/1/2020).  Recheck FLP.  4. Prevention  Flu Shot-recommend annually.  Ophthalmology-recommend annually. Last visit was 1 year back.  ASA-not indicated secondary to age.  Smoking-no.    Most Recent Immunizations   Administered Date(s) Administered     DTAP (<7y) 08/27/1998     DTP-Hib 12/19/1994     Flu, Unspecified 12/12/1996     Hep B, Peds or Adolescent 04/07/1994     HepB 05/27/2021     HepB, Unspecified 04/07/1994     Influenza (IIV3) PF 10/30/2008     Influenza Vaccine IM > 6 months Valent IIV4 (Alfuria,Fluzone) 10/01/2020     Influenza,INJ,MDCK,PF,Quad >4yrs 10/05/2019     MMR 08/23/2000      Meningococcal (Menveo ) 09/19/2011     OPV, trivalent, live 08/27/1998     Poliovirus, inactivated (IPV) 08/27/1998     TDAP Vaccine (Boostrix) 07/30/2014     TRIHIBIT (DTAP/HIB, <7y) 08/27/1998     Td (Adult), Adsorbed 10/07/2004     Varicella 09/19/2011         Recommend checking blood sugars before meals and at bedtime.    If Blood glucose are low more often-> 2-3 times/week- give us a call.  The patient is advised to Make better food choices: reduce carbs, Reduce portion size, weight loss and exercise 3-4 times a week.  Discussed hypoglycemia signs and symptoms as well as management in detail.      There is some variability among people, most will usually develop symptoms suggestive of hypoglycemia when blood glucose levels are lowered to the mid 60's. The first set of symptoms are called adrenergic. Patients may experience any of the following nervousness, sweating, intense hunger, trembling, weakness, palpitations, and difficulty speaking. When BS fall below 50 the patient is unable to talk and take oral therapy.  Would recommend Glucagon emergency kit for the patient and education for family and friends around the patient.   The acute management of hypoglycemia involves the rapid delivery of a source of easily absorbed sugar. Regular soda, juice, lifesavers, table sugar, are good options. 15 grams of glucose is the dose that is given, followed by an assessment of symptoms and a blood glucose check if possible. If after 10 minutes there is no improvement, another 10-15 grams should be given. This can be repeated up to three times. The equivalency of 10-15 grams of glucose (approximate servings) are: Four lifesavers, 4 teaspoons of sugar, or 1/2 cup or 4 oz of juice or regular pop.    Follow-up:  3 months    Kaya Fernandez M.D  Endocrinology  [facility]  CC: Clinic, [facility]      All questions were answered.  The patient indicates understanding of the above issues and agrees with  the plan set forth.     Disclaimer: This note consists of symbols derived from keyboarding, dictation and/or voice recognition software. As a result, there may be errors in the script that have gone undetected. Please consider this when interpreting information found in this chart.    Addendum to above note and clinic visit:    Labs reviewed.    See result note/telephone encounter.              Answers for HPI/ROS submitted by the patient on 7/11/2022  General Symptoms: No  Skin Symptoms: No  HENT Symptoms: No  EYE SYMPTOMS: No  HEART SYMPTOMS: No  LUNG SYMPTOMS: No  INTESTINAL SYMPTOMS: No  URINARY SYMPTOMS: No  REPRODUCTIVE SYMPTOMS: No  SKELETAL SYMPTOMS: No  BLOOD SYMPTOMS: No  NERVOUS SYSTEM SYMPTOMS: No  MENTAL HEALTH SYMPTOMS: No          Again, thank you for allowing me to participate in the care of your patient.        Sincerely,        Kaya Fernandez MD

## 2022-07-12 NOTE — NURSING NOTE
PT is taking 35 units of lantus and patient is taking 2.5 units per 15 grams of carbs and correction scale 3 units per 50 points over 150 of other insulin.

## 2022-07-12 NOTE — TELEPHONE ENCOUNTER
Writer tried to contact patient in regards to scheduling 3 month follow up with labs prior per Dr. Fernandez, but was unable to reach the patient. Writer left a generic message for patient to call back. If patient calls back, please assist with scheduling follow up.    Bonnie Albert, Virtual Visit Facilitator 11:52 AM July 12, 2022

## 2022-07-12 NOTE — PROGRESS NOTES
Bandar is a 26 year old who is being evaluated via a billable video visit.        How would you like to obtain your AVS? MyChart  If the video visit is dropped, the invitation should be resent by: Text to cell phone: [phone]  Will anyone else be joining your video visit? No

## 2022-07-12 NOTE — PATIENT INSTRUCTIONS
[facility]  Dr Fernandez, Endocrinology Department    [facility]   [address] [facility]  [place] [address]  Appointment [phone]  Fax: [phone]  [place]: Monday - Thursday      To provide the best diabetic care, please bring your blood glucose meter to each and every visit with your Endocrinologist.  Your blood glucose meter/insulin pump will be downloaded at every appointment.    Please arrive 15 minutes before your scheduled appointment.  This will allow for your blood glucose meter/insulin pump to be downloaded.  If you are wearing DEXCOM please bring  or sharing code so that it can be downloaded.  If you are using freestyle amauri personal sensors please bring the reader.  If you are using TANDEM insulin pump please have your username and password to get info from [employer] website.    Fasting labs needed.  Please make lab appointment.  Continue to use amauri- scan often.  Increase Lantus to 38 units/day  Continue NovoLog at current dose except decrease correctional scale--take 2 units for 50 points above 150.  Continue current carb counting.-- new scale will be- take 2.5 units/15 gm of CHO + correctional scale 2 units for 50 points above 150.  Try to be consistent with NovoLog use with each meal.  Follow-up with diabetic educator for education regarding insulin pump-OmniPod.  Please check cost of insulin pump with insurance.  Repeat labs in 3 months.  Follow-up in 3 months.  Please make a lab appointment for blood work and follow up clinic appointment in 1 week after that to discuss results.    Recommend checking blood sugars before meals and at bedtime.    If Blood glucose are low more often-> 2-3 times/week- give us a call.  Make better food choices: reduce carbs, Reduce portion size, weight loss and exercise 3-4 times a week.    What is hypoglycemia:  Hypoglycemia is when blood sugar levels become too low - below 70 m/dl.      What causes  hypoglycemia?  - using too much insulin  -taking too many diabetes pills  -not eating enough, or skipping meals or snacks  -not eating enough carbohydrate with meals  -changing your exercise routine  -drinking alcohol in excess    It is also possible to have hypoglycemia even when you are carefully managing your blood sugar levels.    What does it feel like when blood sugars get too low?  You may feel:  - anxious  -confused  -dizzy  -hungry  -light-headed  -nervous  -shaky  -sleepy  -sweaty    You may have  -blurred or cloudy vision  -heart palpitations (heart skips a beat or races)  -tingling or numbness around the mouth and tongue  -tremors    What to do if you have symptoms of hypoglycmemia:  If you think your blood sugar is too low, check it with a glucose meter.  If its below 70 mg/dl, consume one of the following:  Fruit juice (1/2 cup)  Glucose tablets (15 grams)  Hard candy (5 to 7 pieces)  Honey or sugar (2 teaspoons)  Milk (1/2 cup)  Soft drink (non-diet, 1/2 cup)    Wait 15 minutes and check your blood glucose again.  IF it is still below 70 mg/dl, have another food item listed above. Wait another 15 minutes and repeat the blood glucose test.  Have a small meal or snack that contains some carbohydrate after your blood glucose rises above 70 mg/dl.    If you are at risk of hypoglycemia, always carry with you glucose tablets or one of the foods listed above.      To prevent Hypoglycemia:  Avoid situations that may cause hypoglycemia  Before making any change to your diet or exercise routine, discuss them with your healthcare provider  Keep a record of your blood glucose levels.  Include the time of day, diabetes medications, when you had your last meal or snack, and what you were doing at the time (e.g. Watching TV, gardening, jogging, etc).    Talk to your healthcare provider if your blood glucose levels are often low        Patient guide on  hypoglycemia    http://www.hormone.org/Resources/upload/patient-guide-diagnosis-and-management-hypoglycemia-[email or web address].pdf

## 2022-09-18 ENCOUNTER — HEALTH MAINTENANCE LETTER (OUTPATIENT)
Age: 27
End: 2022-09-18

## 2022-11-25 ENCOUNTER — TELEPHONE (OUTPATIENT)
Dept: ENDOCRINOLOGY | Facility: CLINIC | Age: 27
End: 2022-11-25

## 2022-11-25 NOTE — TELEPHONE ENCOUNTER
Attempted to reach patient for upcoming appointment, data needed for blood glucose readings. No answer, LM on VM to call office back.     Appointment with Dr. Fernandez  on 11/28/22    Laura Hess MA

## 2023-01-28 ENCOUNTER — HEALTH MAINTENANCE LETTER (OUTPATIENT)
Age: 28
End: 2023-01-28

## 2023-05-07 ENCOUNTER — HEALTH MAINTENANCE LETTER (OUTPATIENT)
Age: 28
End: 2023-05-07

## 2023-07-30 ENCOUNTER — HEALTH MAINTENANCE LETTER (OUTPATIENT)
Age: 28
End: 2023-07-30

## 2024-02-25 ENCOUNTER — HEALTH MAINTENANCE LETTER (OUTPATIENT)
Age: 29
End: 2024-02-25

## 2024-07-14 ENCOUNTER — HEALTH MAINTENANCE LETTER (OUTPATIENT)
Age: 29
End: 2024-07-14

## 2024-09-22 ENCOUNTER — HEALTH MAINTENANCE LETTER (OUTPATIENT)
Age: 29
End: 2024-09-22

## 2025-04-05 ENCOUNTER — HEALTH MAINTENANCE LETTER (OUTPATIENT)
Age: 30
End: 2025-04-05